# Patient Record
Sex: FEMALE | Race: WHITE | Employment: OTHER | URBAN - METROPOLITAN AREA
[De-identification: names, ages, dates, MRNs, and addresses within clinical notes are randomized per-mention and may not be internally consistent; named-entity substitution may affect disease eponyms.]

---

## 2017-10-10 ENCOUNTER — TELEPHONE (OUTPATIENT)
Dept: FAMILY MEDICINE CLINIC | Facility: CLINIC | Age: 82
End: 2017-10-10

## 2017-10-11 ENCOUNTER — OFFICE VISIT (OUTPATIENT)
Dept: FAMILY MEDICINE CLINIC | Facility: CLINIC | Age: 82
End: 2017-10-11

## 2017-10-11 VITALS
DIASTOLIC BLOOD PRESSURE: 64 MMHG | RESPIRATION RATE: 12 BRPM | BODY MASS INDEX: 26.06 KG/M2 | TEMPERATURE: 100 F | SYSTOLIC BLOOD PRESSURE: 110 MMHG | WEIGHT: 138 LBS | HEIGHT: 61 IN | HEART RATE: 60 BPM

## 2017-10-11 DIAGNOSIS — Z79.01 LONG TERM CURRENT USE OF ANTICOAGULANT: ICD-10-CM

## 2017-10-11 DIAGNOSIS — F32.5 MAJOR DEPRESSION IN REMISSION (HCC): ICD-10-CM

## 2017-10-11 DIAGNOSIS — E78.00 PURE HYPERCHOLESTEROLEMIA: ICD-10-CM

## 2017-10-11 DIAGNOSIS — Z85.828 HISTORY OF SKIN CANCER: ICD-10-CM

## 2017-10-11 DIAGNOSIS — I63.9 CEREBROVASCULAR ACCIDENT (CVA), UNSPECIFIED MECHANISM (HCC): Primary | ICD-10-CM

## 2017-10-11 DIAGNOSIS — I10 ESSENTIAL HYPERTENSION: ICD-10-CM

## 2017-10-11 DIAGNOSIS — Z87.11 HISTORY OF GASTRIC ULCER: ICD-10-CM

## 2017-10-11 DIAGNOSIS — G45.9 TRANSIENT CEREBRAL ISCHEMIA, UNSPECIFIED TYPE: ICD-10-CM

## 2017-10-11 DIAGNOSIS — F41.9 ANXIETY: ICD-10-CM

## 2017-10-11 PROBLEM — M17.11 ARTHRITIS OF KNEE, RIGHT: Status: ACTIVE | Noted: 2017-10-11

## 2017-10-11 PROCEDURE — 99204 OFFICE O/P NEW MOD 45 MIN: CPT | Performed by: FAMILY MEDICINE

## 2017-10-11 PROCEDURE — 99358 PROLONG SERVICE W/O CONTACT: CPT | Performed by: FAMILY MEDICINE

## 2017-10-11 NOTE — PROGRESS NOTES
Patient presents with:  Establish Care: New Patient     HPI:   Navya Kumar is a 80year old female who presents to the office to establish care. Feels like she is in good health currently.   Used to see Dr. Radha Paredes in [facility].     Knee - act including hip     Actinic keratosis     Encounter for therapeutic drug monitoring     Long term (current) use of anticoagulants     Seborrheic keratoses        Current Outpatient Prescriptions on File Prior to Visit:  lisinopril 20 MG Oral Tab Take 1 table Packs/day     Quit date: 10/11/1977    Smokeless tobacco: Never Used    Comment: smoked 1ppd for 3-4 yrs.      Alcohol use Yes    Comment: 1 drink daily    Drug use: No    Sexual activity: Not on file     Other Topics Concern   None on file     Social Histo Cerebrovascular accident (CVA), unspecified mechanism ([facility] [address]  X 3 total.  Some TIAs. Dates as above  On AC since then, and has not had recurrence. No residual deficits. Continue AC. Managed by cardio currently.      2. Transient cerebral ischemia, uns possible.           Tim Pfeiffer M.D.   EMG 3  10/13/17

## 2017-12-27 ENCOUNTER — TELEPHONE (OUTPATIENT)
Dept: FAMILY MEDICINE CLINIC | Facility: CLINIC | Age: 82
End: 2017-12-27

## 2017-12-27 NOTE — TELEPHONE ENCOUNTER
Pt will be here 15 min prior to answer Annual Assessment Form for her Medicare Annual w/Dr Eldon Evans on 1/2/18 (form by daily checklist)

## 2018-01-02 ENCOUNTER — OFFICE VISIT (OUTPATIENT)
Dept: FAMILY MEDICINE CLINIC | Facility: CLINIC | Age: 83
End: 2018-01-02

## 2018-01-02 ENCOUNTER — LAB ENCOUNTER (OUTPATIENT)
Dept: LAB | Age: 83
End: 2018-01-02
Attending: FAMILY MEDICINE
Payer: MEDICARE

## 2018-01-02 VITALS
HEIGHT: 61.25 IN | DIASTOLIC BLOOD PRESSURE: 60 MMHG | BODY MASS INDEX: 26.09 KG/M2 | SYSTOLIC BLOOD PRESSURE: 136 MMHG | TEMPERATURE: 98 F | WEIGHT: 140 LBS | HEART RATE: 72 BPM

## 2018-01-02 DIAGNOSIS — I10 ESSENTIAL HYPERTENSION: ICD-10-CM

## 2018-01-02 DIAGNOSIS — Z87.11 HISTORY OF GASTRIC ULCER: ICD-10-CM

## 2018-01-02 DIAGNOSIS — Z13.31 DEPRESSION SCREENING: ICD-10-CM

## 2018-01-02 DIAGNOSIS — I63.9 CEREBROVASCULAR ACCIDENT (CVA), UNSPECIFIED MECHANISM (HCC): ICD-10-CM

## 2018-01-02 DIAGNOSIS — F32.5 MAJOR DEPRESSION IN REMISSION (HCC): ICD-10-CM

## 2018-01-02 DIAGNOSIS — Z00.00 ENCOUNTER FOR ANNUAL HEALTH EXAMINATION: Primary | ICD-10-CM

## 2018-01-02 DIAGNOSIS — E78.00 PURE HYPERCHOLESTEROLEMIA: ICD-10-CM

## 2018-01-02 LAB
ALBUMIN SERPL-MCNC: 3.7 G/DL (ref 3.5–4.8)
ALP LIVER SERPL-CCNC: 81 U/L (ref 55–142)
ALT SERPL-CCNC: 29 U/L (ref 14–54)
AST SERPL-CCNC: 34 U/L (ref 15–41)
BILIRUB SERPL-MCNC: 0.6 MG/DL (ref 0.1–2)
BUN BLD-MCNC: 19 MG/DL (ref 8–20)
CALCIUM BLD-MCNC: 9.5 MG/DL (ref 8.3–10.3)
CHLORIDE: 106 MMOL/L (ref 101–111)
CHOLEST SMN-MCNC: 162 MG/DL (ref ?–200)
CO2: 26 MMOL/L (ref 22–32)
CREAT BLD-MCNC: 0.95 MG/DL (ref 0.55–1.02)
GLUCOSE BLD-MCNC: 98 MG/DL (ref 70–99)
HDLC SERPL-MCNC: 66 MG/DL (ref 45–?)
HDLC SERPL: 2.45 {RATIO} (ref ?–4.44)
LDLC SERPL CALC-MCNC: 76 MG/DL (ref ?–130)
M PROTEIN MFR SERPL ELPH: 7.2 G/DL (ref 6.1–8.3)
NONHDLC SERPL-MCNC: 96 MG/DL (ref ?–130)
POTASSIUM SERPL-SCNC: 4.1 MMOL/L (ref 3.6–5.1)
SODIUM SERPL-SCNC: 141 MMOL/L (ref 136–144)
TRIGL SERPL-MCNC: 101 MG/DL (ref ?–150)
VLDLC SERPL CALC-MCNC: 20 MG/DL (ref 5–40)

## 2018-01-02 PROCEDURE — 80053 COMPREHEN METABOLIC PANEL: CPT

## 2018-01-02 PROCEDURE — G0439 PPPS, SUBSEQ VISIT: HCPCS | Performed by: FAMILY MEDICINE

## 2018-01-02 PROCEDURE — 80061 LIPID PANEL: CPT

## 2018-01-02 PROCEDURE — G0444 DEPRESSION SCREEN ANNUAL: HCPCS | Performed by: FAMILY MEDICINE

## 2018-01-02 RX ORDER — OMEPRAZOLE 20 MG/1
CAPSULE, DELAYED RELEASE ORAL
COMMUNITY
Start: 2017-10-14 | End: 2018-01-02

## 2018-01-02 RX ORDER — ESCITALOPRAM OXALATE 10 MG/1
10 TABLET ORAL DAILY
Qty: 90 TABLET | Refills: 3 | Status: SHIPPED | OUTPATIENT
Start: 2018-01-02 | End: 2019-01-07

## 2018-01-02 RX ORDER — NICOTINE POLACRILEX 4 MG/1
1 GUM, CHEWING ORAL DAILY
Qty: 90 TABLET | Refills: 3 | Status: SHIPPED | OUTPATIENT
Start: 2018-01-02 | End: 2018-02-01

## 2018-01-02 NOTE — PATIENT INSTRUCTIONS
Ryan Callahan's SCREENING SCHEDULE   Tests on this list are recommended by your physician but may not be covered, or covered at this frequency, by your insurer. Please check with your insurance carrier before scheduling to verify coverage.    P have smoked more than 100 cigarettes in their lifetime   • Anyone with a family history    Colorectal Cancer Screening  Covered up to Age 76     Colonoscopy Screen   Covered every 10 years- more often if abnormal There are no preventive care reminders to d orders found for this or any previous visit. Please get every year    Pneumococcal 13 (Prevnar)  Covered Once after 65 No orders found for this or any previous visit.  Please get once after your 65th birthday    Pneumococcal 23 (Pneumovax)  Covered Once aft

## 2018-01-02 NOTE — PROGRESS NOTES
HPI:   Tameka Nina is a 80year old female who presents for a Medicare Subsequent Annual Wellness visit (Pt already had Initial Annual Wellness). Preventative  Breast: last year. Done at [facility].   Reports as normal.   Colon: last about 5 yrs a standard forms performed Face to Face with patient and Family/surrogate (if present), and forms available to patient in AVS           She smoked tobacco in the past but quit greater than 12 months ago.   Smoking status: Former Smoker Date   WBC 10.2 03/16/2011   HGB 14.3 03/16/2011   .0 03/16/2011        ALLERGIES:   She has No Known Allergies.     CURRENT MEDICATIONS:     Outpatient Prescriptions Marked as Taking for the 1/2/18 encounter (Office Visit) with Yumiko Saab MD: negative  Respiratory: negative  Cardiovascular: negative  Gastrointestinal: negative  Genitourinary:negative  Neurological: negative      EXAM:   /60   Pulse 72   Temp 98.4 °F (36.9 °C) (Oral)   Ht 61.25\"   Wt 140 lb   BMI 26.24 kg/m²  Estimated papi Vaccination History     Immunization History   Administered Date(s) Administered   • Influenza 09/11/2014   • Influenza Vaccine, High Dose, Preserv Free 10/07/2015, 10/01/2016, 10/06/2017   • Pneumococcal (Prevnar 13) 02/26/2016   • Pneumovax 23 11 past six months, have you lost more than 10 pounds without trying?: 2 - No  Has your appetite been poor?: No  How does the patient maintain a good energy level?: Other  How would you describe your daily physical activity?: Moderate  How would you describe risk No results found for: CHLAMYDIA No flowsheet data found. Screening Mammogram      Mammogram Annually to 76, then as discussed There are no preventive care reminders to display for this patient.  Update Health Maintenance if applicable     Immunizati

## 2018-12-31 ENCOUNTER — TELEPHONE (OUTPATIENT)
Dept: FAMILY MEDICINE CLINIC | Facility: CLINIC | Age: 83
End: 2018-12-31

## 2019-01-03 RX ORDER — OMEPRAZOLE 20 MG/1
CAPSULE, DELAYED RELEASE ORAL
COMMUNITY
Start: 2018-10-14 | End: 2019-01-07

## 2019-01-07 ENCOUNTER — APPOINTMENT (OUTPATIENT)
Dept: LAB | Age: 84
End: 2019-01-07
Attending: FAMILY MEDICINE
Payer: MEDICARE

## 2019-01-07 ENCOUNTER — OFFICE VISIT (OUTPATIENT)
Dept: FAMILY MEDICINE CLINIC | Facility: CLINIC | Age: 84
End: 2019-01-07
Payer: MEDICARE

## 2019-01-07 VITALS
BODY MASS INDEX: 28.22 KG/M2 | TEMPERATURE: 99 F | HEART RATE: 67 BPM | HEIGHT: 59 IN | RESPIRATION RATE: 14 BRPM | WEIGHT: 140 LBS | OXYGEN SATURATION: 97 % | SYSTOLIC BLOOD PRESSURE: 124 MMHG | DIASTOLIC BLOOD PRESSURE: 68 MMHG

## 2019-01-07 DIAGNOSIS — I63.9 CEREBROVASCULAR ACCIDENT (CVA), UNSPECIFIED MECHANISM (HCC): ICD-10-CM

## 2019-01-07 DIAGNOSIS — F32.5 MAJOR DEPRESSION IN REMISSION (HCC): ICD-10-CM

## 2019-01-07 DIAGNOSIS — E78.00 PURE HYPERCHOLESTEROLEMIA: ICD-10-CM

## 2019-01-07 DIAGNOSIS — Z00.00 ENCOUNTER FOR ANNUAL HEALTH EXAMINATION: Primary | ICD-10-CM

## 2019-01-07 DIAGNOSIS — I10 ESSENTIAL HYPERTENSION: ICD-10-CM

## 2019-01-07 DIAGNOSIS — M48.061 SPINAL STENOSIS OF LUMBAR REGION WITHOUT NEUROGENIC CLAUDICATION: ICD-10-CM

## 2019-01-07 DIAGNOSIS — Z13.31 DEPRESSION SCREENING: ICD-10-CM

## 2019-01-07 LAB
ALBUMIN SERPL-MCNC: 3.8 G/DL (ref 3.1–4.5)
ALBUMIN/GLOB SERPL: 1.1 {RATIO} (ref 1–2)
ALP LIVER SERPL-CCNC: 94 U/L (ref 55–142)
ALT SERPL-CCNC: 25 U/L (ref 14–54)
ANION GAP SERPL CALC-SCNC: 6 MMOL/L (ref 0–18)
AST SERPL-CCNC: 24 U/L (ref 15–41)
BILIRUB SERPL-MCNC: 0.6 MG/DL (ref 0.1–2)
BUN BLD-MCNC: 15 MG/DL (ref 8–20)
BUN/CREAT SERPL: 15.6 (ref 10–20)
CALCIUM BLD-MCNC: 9.6 MG/DL (ref 8.3–10.3)
CHLORIDE SERPL-SCNC: 108 MMOL/L (ref 101–111)
CHOLEST SMN-MCNC: 178 MG/DL (ref ?–200)
CO2 SERPL-SCNC: 27 MMOL/L (ref 22–32)
CREAT BLD-MCNC: 0.96 MG/DL (ref 0.55–1.02)
GLOBULIN PLAS-MCNC: 3.6 G/DL (ref 2.8–4.4)
GLUCOSE BLD-MCNC: 100 MG/DL (ref 70–99)
HDLC SERPL-MCNC: 63 MG/DL (ref 40–59)
LDLC SERPL CALC-MCNC: 93 MG/DL (ref ?–100)
M PROTEIN MFR SERPL ELPH: 7.4 G/DL (ref 6.4–8.2)
NONHDLC SERPL-MCNC: 115 MG/DL (ref ?–130)
OSMOLALITY SERPL CALC.SUM OF ELEC: 293 MOSM/KG (ref 275–295)
POTASSIUM SERPL-SCNC: 4 MMOL/L (ref 3.6–5.1)
SODIUM SERPL-SCNC: 141 MMOL/L (ref 136–144)
TRIGL SERPL-MCNC: 108 MG/DL (ref 30–149)
VLDLC SERPL CALC-MCNC: 22 MG/DL (ref 0–30)

## 2019-01-07 PROCEDURE — G0439 PPPS, SUBSEQ VISIT: HCPCS | Performed by: FAMILY MEDICINE

## 2019-01-07 PROCEDURE — G0444 DEPRESSION SCREEN ANNUAL: HCPCS | Performed by: FAMILY MEDICINE

## 2019-01-07 PROCEDURE — 80053 COMPREHEN METABOLIC PANEL: CPT

## 2019-01-07 PROCEDURE — 36415 COLL VENOUS BLD VENIPUNCTURE: CPT

## 2019-01-07 PROCEDURE — 80061 LIPID PANEL: CPT

## 2019-01-07 RX ORDER — ESCITALOPRAM OXALATE 10 MG/1
10 TABLET ORAL DAILY
Qty: 90 TABLET | Refills: 3 | Status: SHIPPED | OUTPATIENT
Start: 2019-01-07 | End: 2019-09-26

## 2019-01-07 RX ORDER — OMEPRAZOLE 20 MG/1
20 CAPSULE, DELAYED RELEASE ORAL EVERY MORNING
Qty: 90 CAPSULE | Refills: 3 | Status: SHIPPED | OUTPATIENT
Start: 2019-01-07 | End: 2020-01-15

## 2019-01-07 NOTE — PATIENT INSTRUCTIONS
Ryan Callahan's SCREENING SCHEDULE   Tests on this list are recommended by your physician but may not be covered, or covered at this frequency, by your insurer. Please check with your insurance carrier before scheduling to verify coverage.    P patients who meet one of the following criteria:   • Men who are 73-68 years old and have smoked more than 100 cigarettes in their lifetime   • Anyone with a family history    Colorectal Cancer Screening  Covered up to Age 76     Colonoscopy Screen   Cover Immunizations      Influenza  Covered Annually No orders found for this or any previous visit. Please get every year    Pneumococcal 13 (Prevnar)  Covered Once after 65 No orders found for this or any previous visit.  Please get once after your 65th birth

## 2019-01-07 NOTE — PROGRESS NOTES
HPI:   Gail Mosley is a 80year old female who presents for a Medicare Subsequent Annual Wellness visit (Pt already had Initial Annual Wellness). Preventative  Breast: last mammogram 2 yrs ago. Normal at that time.   Does report a remote ab at all  Feeling down, depressed, or hopeless (over the last two weeks)?: Not at all  PHQ-2 SCORE: 0     Advanced Directive:   She does have a Living Will but we do NOT have it on file in [employer].    The patient has this document but we do not have it in Epic, lb  01/02/18 : 140 lb     Last Cholesterol Labs:   Lab Results   Component Value Date    CHOLEST 162 01/02/2018    HDL 66 01/02/2018    LDL 76 01/02/2018    TRIG 101 01/02/2018          Last Chemistry Labs:   Lab Results   Component Value Date    AST 34 01 years ago. She smoked 1.00 pack per day. she has never used smokeless tobacco. She reports that she drinks alcohol. She reports that she does not use drugs.      REVIEW OF SYSTEMS:   Constitutional: negative  Eyes: negative  Ears, nose, mouth, throat, and f Skin: Skin color, texture, turgor normal, no rashes or lesions   Lymph nodes: Cervical, supraclavicular, and axillary nodes normal   Neurologic: Normal.  Normal straight leg raise, normal bilateral patellar reflexes and sensation in legs.          Vaccina METABOLIC PANEL (14); Future  - LIPID PANEL; Future    7. Depression screening  Routine.  - DEPRESSION SCREEN ANNUAL            Diet assessment: good     PLAN:  The patient indicates understanding of these issues and agrees to the plan.   Reinforced healthy No flowsheet data found. Bone Density Screening      Dexascan Every two years No results found for this or any previous visit. No flowsheet data found.     Pap and Pelvic      Pap: Every 3 yrs age 21-65 or Pap+HPV every 5 yrs age 33-67, age 72 and older 4.0    No flowsheet data found. Creatinine  Annually CREATININE (mg/dL)   Date Value   12/16/2016 0.96   03/16/2011 0.9     Creatinine (mg/dL)   Date Value   01/02/2018 0.95    No flowsheet data found.     Drug Serum Conc  Annually No results found for:

## 2019-01-15 ENCOUNTER — HOSPITAL ENCOUNTER (OUTPATIENT)
Dept: PHYSICAL THERAPY | Facility: HOSPITAL | Age: 84
Setting detail: THERAPIES SERIES
Discharge: HOME | End: 2019-01-15
Attending: FAMILY MEDICINE
Payer: MEDICARE

## 2019-01-15 DIAGNOSIS — M48.061 SPINAL STENOSIS OF LUMBAR REGION WITHOUT NEUROGENIC CLAUDICATION: ICD-10-CM

## 2019-01-15 PROCEDURE — 97110 THERAPEUTIC EXERCISES: CPT | Performed by: PHYSICAL THERAPIST

## 2019-01-15 PROCEDURE — 97162 PT EVAL MOD COMPLEX 30 MIN: CPT | Performed by: PHYSICAL THERAPIST

## 2019-01-15 NOTE — PROGRESS NOTES
SPINE EVALUATION:   Referring Physician: Dr. Leonidas Field  Diagnosis: Spinal stenosis of lumbar region without neurogenic claudication (Z99.908)     Date of Service: 1/15/2019     PATIENT SUMMARY   Kristy Guzmán is a 80year old y/o female who pres history of Anxiety, Diverticulosis, Essential hypertension, Hyperlipidemia, Hypertension, Long term (current) use of anticoagulants, Muscle cramps, Patent foramen ovale, Spinal stenosis, and Transient cerebral ischemia.     ASSESSMENT  The patient's signs a 5/5  Hip abduction: R 5/5; L 5/5  Hip Extension: R 5/5; L 5/5   Hip ER: R 5/5; L 5/5  Hip IR: R 5/5; L 5/5  Knee Flexion: R 5/5; L 5/5   Knee extension: R 5/5; L 5/5   PF: R 5/5; L 5/5  DF: R 5/5; L 5/5     Flexibility:   UE/Scapular LE    Hip Flexor: R15, maintain progress achieved in PT (8 visits)      Frequency / Duration: Patient will be seen for 2 x/week or a total of 8 visits over a 90 day period. Treatment will include: Manual Therapy; Therapeutic Exercises; Neuromuscular Re-education;  Therapeutic Act

## 2019-01-18 ENCOUNTER — HOSPITAL ENCOUNTER (OUTPATIENT)
Dept: PHYSICAL THERAPY | Facility: HOSPITAL | Age: 84
Setting detail: THERAPIES SERIES
Discharge: HOME | End: 2019-01-18
Attending: FAMILY MEDICINE
Payer: MEDICARE

## 2019-01-18 PROCEDURE — 97110 THERAPEUTIC EXERCISES: CPT | Performed by: PHYSICAL THERAPIST

## 2019-01-18 PROCEDURE — 97140 MANUAL THERAPY 1/> REGIONS: CPT | Performed by: PHYSICAL THERAPIST

## 2019-01-18 NOTE — PROGRESS NOTES
Dx: Spinal stenosis of lumbar region without neurogenic claudication (M48.061)        Authorized # of Visits:  8         Next MD visit: none scheduled  Fall Risk: standard         Precautions: n/a             Subjective:  Walking  is still hard.       Brandon Stevens Skilled Services: TNE, posture, body mechanics [facility]     Charges: 2 te 1 manual        Total Timed Treatment: 40 min  Total Treatment Time: 45 min

## 2019-01-22 ENCOUNTER — APPOINTMENT (OUTPATIENT)
Dept: PHYSICAL THERAPY | Facility: HOSPITAL | Age: 84
End: 2019-01-22
Attending: FAMILY MEDICINE
Payer: MEDICARE

## 2019-01-24 ENCOUNTER — HOSPITAL ENCOUNTER (OUTPATIENT)
Dept: PHYSICAL THERAPY | Facility: HOSPITAL | Age: 84
Setting detail: THERAPIES SERIES
Discharge: HOME | End: 2019-01-24
Attending: FAMILY MEDICINE
Payer: MEDICARE

## 2019-01-24 PROCEDURE — 97110 THERAPEUTIC EXERCISES: CPT | Performed by: PHYSICAL THERAPIST

## 2019-01-24 PROCEDURE — 97140 MANUAL THERAPY 1/> REGIONS: CPT | Performed by: PHYSICAL THERAPIST

## 2019-01-24 NOTE — PROGRESS NOTES
Dx: Spinal stenosis of lumbar region without neurogenic claudication (M48.061)           Authorized # of Visits:  8         Next MD visit: none scheduled  Fall Risk: standard         Precautions: n/a             Subjective: Felt really good after the last

## 2019-01-29 ENCOUNTER — APPOINTMENT (OUTPATIENT)
Dept: PHYSICAL THERAPY | Facility: HOSPITAL | Age: 84
End: 2019-01-29
Attending: FAMILY MEDICINE
Payer: MEDICARE

## 2019-02-05 ENCOUNTER — HOSPITAL ENCOUNTER (OUTPATIENT)
Dept: PHYSICAL THERAPY | Facility: HOSPITAL | Age: 84
Setting detail: THERAPIES SERIES
Discharge: HOME | End: 2019-02-05
Attending: FAMILY MEDICINE
Payer: MEDICARE

## 2019-02-05 PROCEDURE — 97110 THERAPEUTIC EXERCISES: CPT | Performed by: PHYSICAL THERAPIST

## 2019-02-05 PROCEDURE — 97140 MANUAL THERAPY 1/> REGIONS: CPT | Performed by: PHYSICAL THERAPIST

## 2019-02-05 NOTE — PROGRESS NOTES
Dx: Spinal stenosis of lumbar region without neurogenic claudication (M48.061)           Authorized # of Visits:  8         Next MD visit: none scheduled  Fall Risk: standard         Precautions: n/a             Subjective: Have had some pain in my left bu

## 2019-02-07 ENCOUNTER — HOSPITAL ENCOUNTER (OUTPATIENT)
Dept: PHYSICAL THERAPY | Facility: HOSPITAL | Age: 84
Setting detail: THERAPIES SERIES
Discharge: HOME | End: 2019-02-07
Attending: FAMILY MEDICINE
Payer: MEDICARE

## 2019-02-07 PROCEDURE — 97110 THERAPEUTIC EXERCISES: CPT | Performed by: PHYSICAL THERAPIST

## 2019-02-12 ENCOUNTER — TELEPHONE (OUTPATIENT)
Dept: PHYSICAL THERAPY | Facility: HOSPITAL | Age: 84
End: 2019-02-12

## 2019-02-12 ENCOUNTER — APPOINTMENT (OUTPATIENT)
Dept: PHYSICAL THERAPY | Facility: HOSPITAL | Age: 84
End: 2019-02-12
Payer: MEDICARE

## 2019-02-14 ENCOUNTER — HOSPITAL ENCOUNTER (OUTPATIENT)
Dept: PHYSICAL THERAPY | Facility: HOSPITAL | Age: 84
Setting detail: THERAPIES SERIES
Discharge: HOME | End: 2019-02-14
Attending: FAMILY MEDICINE
Payer: MEDICARE

## 2019-02-14 PROCEDURE — 97110 THERAPEUTIC EXERCISES: CPT | Performed by: PHYSICAL THERAPIST

## 2019-02-14 NOTE — PROGRESS NOTES
Dx: Spinal stenosis of lumbar region without neurogenic claudication (M48.061)           Authorized # of Visits:  8         Next MD visit: none scheduled  Fall Risk: standard         Precautions: n/a             Subjective: Had ome relief after the last ses

## 2019-02-18 ENCOUNTER — OFFICE VISIT (OUTPATIENT)
Dept: FAMILY MEDICINE CLINIC | Facility: CLINIC | Age: 84
End: 2019-02-18
Payer: MEDICARE

## 2019-02-18 ENCOUNTER — TELEPHONE (OUTPATIENT)
Dept: FAMILY MEDICINE CLINIC | Facility: CLINIC | Age: 84
End: 2019-02-18

## 2019-02-18 VITALS
SYSTOLIC BLOOD PRESSURE: 130 MMHG | RESPIRATION RATE: 16 BRPM | BODY MASS INDEX: 28.43 KG/M2 | OXYGEN SATURATION: 97 % | HEIGHT: 59 IN | TEMPERATURE: 98 F | DIASTOLIC BLOOD PRESSURE: 80 MMHG | WEIGHT: 141 LBS | HEART RATE: 97 BPM

## 2019-02-18 DIAGNOSIS — M54.41 ACUTE RIGHT-SIDED LOW BACK PAIN WITH RIGHT-SIDED SCIATICA: Primary | ICD-10-CM

## 2019-02-18 PROCEDURE — 99213 OFFICE O/P EST LOW 20 MIN: CPT | Performed by: NURSE PRACTITIONER

## 2019-02-18 RX ORDER — METHYLPREDNISOLONE 4 MG/1
TABLET ORAL
Qty: 1 KIT | Refills: 0 | Status: SHIPPED | OUTPATIENT
Start: 2019-02-18 | End: 2019-03-01

## 2019-02-18 RX ORDER — CYCLOBENZAPRINE HCL 5 MG
2.5 TABLET ORAL NIGHTLY PRN
Qty: 5 TABLET | Refills: 0 | Status: SHIPPED | OUTPATIENT
Start: 2019-02-18 | End: 2019-03-01

## 2019-02-18 NOTE — TELEPHONE ENCOUNTER
Pt reports sharp shooting pains in her lower back for the last two nights. Pain results after some time. Patient is without pain right now, however she would like to have the area assessed. Appt given for this afternoon with CLIFOFRD Ross.  Patient verbal

## 2019-02-18 NOTE — PROGRESS NOTES
Patient presents with:  Back Pain: patient c/o severe back pain radiate down her legs since yesterday. HPI:  Presents with 5 day history of worsening pain to right lower back with occasional radiation of pain through right buttock and leg.  Stated has nightly as needed for Muscle spasms. Disp: 5 tablet Rfl: 0   escitalopram (LEXAPRO) 10 MG Oral Tab Take 1 tablet (10 mg total) by mouth daily.  Disp: 90 tablet Rfl: 3   omeprazole 20 MG Oral Capsule Delayed Release Take 1 capsule (20 mg total) by mouth ever low dose- Warned pt about sedation with this medication and advised pt about necessary precautions and activities to avoid. Continue with PT. If no improvement will consider xrays and referral to Dr. Elías Jeong for possible injection if appropriate.  Verbalized u

## 2019-02-18 NOTE — PATIENT INSTRUCTIONS
Take Medrol dose pack. Take all tabs for first day at the same time. Then follow directions on package for remaining days. It is ok to take acetaminophen (Tylenol) while taking this medication.  If you have regular strength tylenol you may

## 2019-02-19 ENCOUNTER — HOSPITAL ENCOUNTER (OUTPATIENT)
Dept: PHYSICAL THERAPY | Facility: HOSPITAL | Age: 84
Setting detail: THERAPIES SERIES
Discharge: HOME | End: 2019-02-19
Attending: FAMILY MEDICINE
Payer: MEDICARE

## 2019-02-19 PROCEDURE — 97110 THERAPEUTIC EXERCISES: CPT | Performed by: PHYSICAL THERAPIST

## 2019-02-19 PROCEDURE — 97140 MANUAL THERAPY 1/> REGIONS: CPT | Performed by: PHYSICAL THERAPIST

## 2019-02-19 NOTE — PROGRESS NOTES
Dx: Spinal stenosis of lumbar region without neurogenic claudication (M48.061)           Authorized # of Visits:  8         Next MD visit: none scheduled  Fall Risk: standard         Precautions: n/a             Subjective:Had a really bad episode of pain x20      Bent knee fall outs x20 red tb   Bent knee fall outs x20 grn tb  Bent knee fall outs x20 grn tb  Passive hamstring stretch bilaterally x3 ea 30 sec each        Lunges for knee flexion x20   Hs stretch on step 4\"  Clams x20 right le.

## 2019-02-25 ENCOUNTER — HOSPITAL ENCOUNTER (OUTPATIENT)
Dept: PHYSICAL THERAPY | Facility: HOSPITAL | Age: 84
Setting detail: THERAPIES SERIES
Discharge: HOME | End: 2019-02-25
Attending: FAMILY MEDICINE
Payer: MEDICARE

## 2019-02-25 PROCEDURE — 97110 THERAPEUTIC EXERCISES: CPT | Performed by: PHYSICAL THERAPIST

## 2019-02-25 PROCEDURE — 97140 MANUAL THERAPY 1/> REGIONS: CPT | Performed by: PHYSICAL THERAPIST

## 2019-02-25 NOTE — PROGRESS NOTES
Dx: Spinal stenosis of lumbar region without neurogenic claudication (M48.061)           Authorized # of Visits:  8         Next MD visit: none scheduled  Fall Risk: standard         Precautions: n/a             Subjective: Still get a sharp pain in my rig supine x20    Bent knee fall outs x20 red tb  TKE x20   Isometric quads x20  Hs stretch x3 @ 15 sec hold TKE in supine x20  TKE in supine x20  TKE in supine x20  Passive hamstring stretch bilaterally x3 ea 30 sec each     Bent knee fall outs x20 red tb   B

## 2019-03-01 ENCOUNTER — HOSPITAL ENCOUNTER (OUTPATIENT)
Dept: PHYSICAL THERAPY | Facility: HOSPITAL | Age: 84
Setting detail: THERAPIES SERIES
Discharge: HOME | End: 2019-03-01
Attending: FAMILY MEDICINE
Payer: MEDICARE

## 2019-03-01 PROCEDURE — 97140 MANUAL THERAPY 1/> REGIONS: CPT | Performed by: PHYSICAL THERAPIST

## 2019-03-01 PROCEDURE — 97110 THERAPEUTIC EXERCISES: CPT | Performed by: PHYSICAL THERAPIST

## 2019-03-01 NOTE — PROGRESS NOTES
Dx: Spinal stenosis of lumbar region without neurogenic claudication (M48.061)           Authorized # of Visits:  8         Next MD visit: none scheduled  Fall Risk: standard         Precautions: n/a             Subjective: Whatever you did the other day i Bridge x20  DKTC Sb   Bridge x10  Pelvic tilt x20  Pelvic tilt x20 Sb DKTC   LBR   Bridge in pain free ranges TKE in supine x20  Red TB bent knee fall outs   Bridge x20   Bent knee fall outs x20 red tb  TKE x20   Isometric quads x20  Hs stretch x3 @ 15 s

## 2019-03-04 ENCOUNTER — HOSPITAL ENCOUNTER (OUTPATIENT)
Dept: PHYSICAL THERAPY | Facility: HOSPITAL | Age: 84
Setting detail: THERAPIES SERIES
Discharge: HOME | End: 2019-03-04
Attending: FAMILY MEDICINE
Payer: MEDICARE

## 2019-03-04 PROCEDURE — 97110 THERAPEUTIC EXERCISES: CPT | Performed by: PHYSICAL THERAPIST

## 2019-03-04 PROCEDURE — 97140 MANUAL THERAPY 1/> REGIONS: CPT | Performed by: PHYSICAL THERAPIST

## 2019-03-04 NOTE — PROGRESS NOTES
Dx: Spinal stenosis of lumbar region without neurogenic claudication (M48.061)           Authorized # of Visits:  8         Next MD visit: none scheduled  Fall Risk: standard         Precautions: n/a             Subjective: Pain is so much better than it w Red TB bent knee fall outs   Bridge x20   Hs stretch x3 @ 15 sec hold  DKTC x20   Bridge x20  DKTC Sb   Bridge x10  Pelvic tilt x20  Pelvic tilt x20 Sb DKTC   LBR   Bridge in pain free ranges TKE in supine x20  Red TB bent knee fall outs   Bridge x20 TKE i

## 2019-03-07 ENCOUNTER — HOSPITAL ENCOUNTER (OUTPATIENT)
Dept: PHYSICAL THERAPY | Facility: HOSPITAL | Age: 84
Setting detail: THERAPIES SERIES
Discharge: HOME | End: 2019-03-07
Attending: FAMILY MEDICINE
Payer: MEDICARE

## 2019-03-07 PROCEDURE — 97110 THERAPEUTIC EXERCISES: CPT | Performed by: PHYSICAL THERAPIST

## 2019-03-07 PROCEDURE — 97140 MANUAL THERAPY 1/> REGIONS: CPT | Performed by: PHYSICAL THERAPIST

## 2019-03-08 NOTE — PROGRESS NOTES
Dx: Spinal stenosis of lumbar region without neurogenic claudication (M48.061)           Authorized # of Visits:  8         Next MD visit: none scheduled  Fall Risk: standard         Precautions: n/a             Subjective: Continues to note sig.  Discomfor sets  YTB side steps x10 x2 sets Red TB bent knee fall outs   Bridge x20  Sb DKTC   LBR   Bridge in pain free ranges Sb DKTC   LBR   Bridge in pain free ranges Red TB bent knee fall outs   Bridge x20 Red TB bent knee fall outs   Bridge x20   Hs stretch x3

## 2019-03-11 ENCOUNTER — HOSPITAL ENCOUNTER (OUTPATIENT)
Dept: PHYSICAL THERAPY | Facility: HOSPITAL | Age: 84
Setting detail: THERAPIES SERIES
Discharge: HOME | End: 2019-03-11
Attending: FAMILY MEDICINE
Payer: MEDICARE

## 2019-03-11 PROCEDURE — 97140 MANUAL THERAPY 1/> REGIONS: CPT | Performed by: PHYSICAL THERAPIST

## 2019-03-11 PROCEDURE — 97110 THERAPEUTIC EXERCISES: CPT | Performed by: PHYSICAL THERAPIST

## 2019-03-11 NOTE — PROGRESS NOTES
Dx: Spinal stenosis of lumbar region without neurogenic claudication (M48.061)           Authorized # of Visits:  8         Next MD visit: none scheduled  Fall Risk: standard         Precautions: n/a           Subjective: Almost  90% better.     Objective: DKTC x20   Bridge x20  Balance board x3' each  Bent knee fall outs   Red tb  YTB side steps x10 x2 sets  YTB side steps x10 x2 sets Red TB bent knee fall outs   Bridge x20  Sb DKTC   LBR   Bridge in pain free ranges Sb DKTC   LBR   Bridge in pain free ra

## 2019-03-14 ENCOUNTER — HOSPITAL ENCOUNTER (OUTPATIENT)
Dept: PHYSICAL THERAPY | Facility: HOSPITAL | Age: 84
Setting detail: THERAPIES SERIES
Discharge: HOME | End: 2019-03-14
Attending: FAMILY MEDICINE
Payer: MEDICARE

## 2019-03-14 PROCEDURE — 97140 MANUAL THERAPY 1/> REGIONS: CPT | Performed by: PHYSICAL THERAPIST

## 2019-03-14 PROCEDURE — 97110 THERAPEUTIC EXERCISES: CPT | Performed by: PHYSICAL THERAPIST

## 2019-03-14 NOTE — PROGRESS NOTES
Dx: Spinal stenosis of lumbar region without neurogenic claudication (M48.061)           Authorized # of Visits:  8         Next MD visit: none scheduled  Fall Risk: standard         Precautions: n/a           Subjective: Almost  90% better.   Can tell that Sb DKTC   LBR   Bridge in pain free ranges Sb DKTC   LBR   Bridge in pain free ranges Red TB bent knee fall outs   Bridge x20 Red TB bent knee fall outs   Bridge x20 Red TB bent knee fall outs   Bridge x20   Hs stretch x3 @ 15 sec hold  DKTC x20   Bridge x

## 2019-03-18 ENCOUNTER — HOSPITAL ENCOUNTER (OUTPATIENT)
Dept: PHYSICAL THERAPY | Facility: HOSPITAL | Age: 84
Setting detail: THERAPIES SERIES
Discharge: HOME | End: 2019-03-18
Attending: FAMILY MEDICINE
Payer: MEDICARE

## 2019-03-18 PROCEDURE — 97110 THERAPEUTIC EXERCISES: CPT | Performed by: PHYSICAL THERAPIST

## 2019-03-18 PROCEDURE — 97140 MANUAL THERAPY 1/> REGIONS: CPT | Performed by: PHYSICAL THERAPIST

## 2019-03-18 NOTE — PROGRESS NOTES
Dx: Spinal stenosis of lumbar region without neurogenic claudication (M48.061)           Authorized # of Visits:  8         Next MD visit: none scheduled  Fall Risk: standard         Precautions: n/a        *Discharge Summary    Pt has attended 14, cancell use shopping cart when shopping  (8 visits)  MET   · Pt will demonstrate improved core strength to be able to perform home, self care activities   with <2/10 pain (8 visits)  MET   · Pt will be independent and compliant with comprehensive HEP to maintain p paraspinal and quadratus mm.  X10 ' Sb DKTC   LBR   Bridge in pain free ranges Sb DKTC   LBR   Bridge in pain free ranges Sb DKTC   LBR   Bridge in pain free ranges Sb DKTC   LBR   Bridge in pain free ranges  (review as she has a ball at home to do this ) ana mm x10'                     Skilled Services: TNE, posture, body mechanics [facility]     Charges: 1te     2 manual     Total Timed Treatment: 40 min  Total Treatment Time: 45 min

## 2019-05-22 ENCOUNTER — OFFICE VISIT (OUTPATIENT)
Dept: FAMILY MEDICINE CLINIC | Facility: CLINIC | Age: 84
End: 2019-05-22
Payer: MEDICARE

## 2019-05-22 VITALS
RESPIRATION RATE: 16 BRPM | HEIGHT: 61 IN | WEIGHT: 136 LBS | BODY MASS INDEX: 25.68 KG/M2 | HEART RATE: 72 BPM | DIASTOLIC BLOOD PRESSURE: 66 MMHG | SYSTOLIC BLOOD PRESSURE: 130 MMHG

## 2019-05-22 DIAGNOSIS — M54.50 CHRONIC RIGHT-SIDED LOW BACK PAIN WITHOUT SCIATICA: ICD-10-CM

## 2019-05-22 DIAGNOSIS — M48.061 SPINAL STENOSIS OF LUMBAR REGION WITHOUT NEUROGENIC CLAUDICATION: Primary | ICD-10-CM

## 2019-05-22 DIAGNOSIS — M25.552 LEFT HIP PAIN: ICD-10-CM

## 2019-05-22 DIAGNOSIS — G89.29 CHRONIC RIGHT-SIDED LOW BACK PAIN WITHOUT SCIATICA: ICD-10-CM

## 2019-05-22 PROCEDURE — 99213 OFFICE O/P EST LOW 20 MIN: CPT | Performed by: FAMILY MEDICINE

## 2019-05-22 NOTE — PROGRESS NOTES
Patient presents with:  Back Pain: pt dx spinal stenosis, pt has completed, pt still experiencing a little pain and wants to discuss how to treat conditon going forward     HPI:   Navya Kumar is a 80year old female who presents to the office (CPT=72100); Future    3. Left hip pain  New issue, L groin  Check XR - concern for hip pathology. - XR HIP W OR WO PELVIS 3 OR 4 VIEWS, BILAT(CPT=73522); Future    Ok to continue tylenol PRN. Up to 6 a day.        Julius Harrison M.D.

## 2019-05-24 ENCOUNTER — HOSPITAL ENCOUNTER (OUTPATIENT)
Dept: GENERAL RADIOLOGY | Facility: HOSPITAL | Age: 84
Discharge: HOME | End: 2019-05-24
Attending: FAMILY MEDICINE
Payer: MEDICARE

## 2019-05-24 DIAGNOSIS — M25.552 LEFT HIP PAIN: ICD-10-CM

## 2019-05-24 DIAGNOSIS — M48.061 SPINAL STENOSIS OF LUMBAR REGION WITHOUT NEUROGENIC CLAUDICATION: ICD-10-CM

## 2019-05-24 DIAGNOSIS — M54.50 CHRONIC RIGHT-SIDED LOW BACK PAIN WITHOUT SCIATICA: ICD-10-CM

## 2019-05-24 DIAGNOSIS — G89.29 CHRONIC RIGHT-SIDED LOW BACK PAIN WITHOUT SCIATICA: ICD-10-CM

## 2019-05-24 PROCEDURE — 73522 X-RAY EXAM HIPS BI 3-4 VIEWS: CPT | Performed by: FAMILY MEDICINE

## 2019-05-24 PROCEDURE — 72110 X-RAY EXAM L-2 SPINE 4/>VWS: CPT | Performed by: FAMILY MEDICINE

## 2019-06-11 ENCOUNTER — OFFICE VISIT (OUTPATIENT)
Dept: FAMILY MEDICINE CLINIC | Facility: CLINIC | Age: 84
End: 2019-06-11
Payer: MEDICARE

## 2019-06-11 VITALS
WEIGHT: 134 LBS | HEART RATE: 84 BPM | HEIGHT: 61 IN | TEMPERATURE: 98 F | RESPIRATION RATE: 16 BRPM | SYSTOLIC BLOOD PRESSURE: 134 MMHG | BODY MASS INDEX: 25.3 KG/M2 | DIASTOLIC BLOOD PRESSURE: 64 MMHG

## 2019-06-11 DIAGNOSIS — M48.061 SPINAL STENOSIS OF LUMBAR REGION WITHOUT NEUROGENIC CLAUDICATION: Primary | ICD-10-CM

## 2019-06-11 DIAGNOSIS — M25.552 LEFT HIP PAIN: ICD-10-CM

## 2019-06-11 PROCEDURE — 99213 OFFICE O/P EST LOW 20 MIN: CPT | Performed by: FAMILY MEDICINE

## 2019-06-11 NOTE — PROGRESS NOTES
Patient presents with:  Test Results: review recent x rays for the back, back pain persists, getting so severethat it is causing difficulties to walke     HPI:   Owen Lukas is a 80year old female who presents to the office for f/u of her ted pains, L sided on and off sciatica. Will get MRI lumbar spine.   If disease, may need to see neurosurgeon or back specialist.   In mean time, continue tylenol and will restart PT (which helped in the past)  - [address]

## 2019-06-18 DIAGNOSIS — I10 HYPERTENSION: ICD-10-CM

## 2019-06-18 DIAGNOSIS — G45.9 TRANSIENT CEREBRAL ISCHEMIA: ICD-10-CM

## 2019-06-18 DIAGNOSIS — E78.5 HYPERLIPIDEMIA: ICD-10-CM

## 2019-06-18 NOTE — TELEPHONE ENCOUNTER
Patient has her MRI spine appointment scheduled for this Friday. Patient stated she is having a little bit of anxiety and would like to know if lorazepam medication can be prescribed.

## 2019-06-20 RX ORDER — LORAZEPAM 0.5 MG/1
0.5 TABLET ORAL EVERY 4 HOURS PRN
Qty: 2 TABLET | Refills: 0 | OUTPATIENT
Start: 2019-06-20 | End: 2019-11-08 | Stop reason: ALTCHOICE

## 2019-06-20 NOTE — TELEPHONE ENCOUNTER
Rx for Lorazepam called to [facility]. Pt notified and she reassured me that her  will be driving her to and from the procedure.

## 2019-06-20 NOTE — TELEPHONE ENCOUNTER
Patient is calling on status she has her MRI scheduled for tomorrow and is calling re: medication to keep her calm

## 2019-06-21 ENCOUNTER — HOSPITAL ENCOUNTER (OUTPATIENT)
Dept: MRI IMAGING | Facility: HOSPITAL | Age: 84
Discharge: HOME | End: 2019-06-21
Attending: FAMILY MEDICINE
Payer: MEDICARE

## 2019-06-21 DIAGNOSIS — M48.061 SPINAL STENOSIS OF LUMBAR REGION WITHOUT NEUROGENIC CLAUDICATION: ICD-10-CM

## 2019-06-21 DIAGNOSIS — M25.552 LEFT HIP PAIN: ICD-10-CM

## 2019-06-21 PROCEDURE — 72148 MRI LUMBAR SPINE W/O DYE: CPT | Performed by: FAMILY MEDICINE

## 2019-06-27 ENCOUNTER — HOSPITAL ENCOUNTER (OUTPATIENT)
Dept: PHYSICAL THERAPY | Facility: HOSPITAL | Age: 84
Setting detail: THERAPIES SERIES
Discharge: HOME | End: 2019-06-27
Attending: FAMILY MEDICINE
Payer: MEDICARE

## 2019-06-27 ENCOUNTER — TELEPHONE (OUTPATIENT)
Dept: FAMILY MEDICINE CLINIC | Facility: CLINIC | Age: 84
End: 2019-06-27

## 2019-06-27 DIAGNOSIS — M48.061 SPINAL STENOSIS OF LUMBAR REGION WITHOUT NEUROGENIC CLAUDICATION: ICD-10-CM

## 2019-06-27 DIAGNOSIS — M25.552 LEFT HIP PAIN: ICD-10-CM

## 2019-06-27 DIAGNOSIS — M48.061 SPINAL STENOSIS OF LUMBAR REGION AT MULTIPLE LEVELS: Primary | ICD-10-CM

## 2019-06-27 DIAGNOSIS — M51.36 DEGENERATIVE DISC DISEASE, LUMBAR: ICD-10-CM

## 2019-06-27 PROCEDURE — 97140 MANUAL THERAPY 1/> REGIONS: CPT | Performed by: PHYSICAL THERAPIST

## 2019-06-27 PROCEDURE — 97162 PT EVAL MOD COMPLEX 30 MIN: CPT | Performed by: PHYSICAL THERAPIST

## 2019-06-27 NOTE — PROGRESS NOTES
SPINE EVALUATION:   Referring Physician: Dr. Zion Liz  Diagnosis: Gait instability (R26.81)  Left groin pain (R10.32)  Joint pain of left hip on movement (M25.552)  Numbness and tingling in both hands (R20.0,R20.2)  Hand weakness (R29.898)  Muscle weakness dizziness, drop attacks, bowel/bladder changes, saddle anesthesia, and CARLA LE N/T.    Ruiz Shirley presents to physical therapy evaluation with primary c/o low back pain, left hip pain . She has a strong postural component to her pain and limited mobility.  The R 4/5; L 4/5  Hip Extension: R 4/5; L 4/5   Hip ER: R 4/5; L 4/5  Hip IR: R 4/5; L 4/5  Knee Flexion: R 4/5; L 4/5   Knee extension (L3): R 4/5; L 4/5   DF (L4): R 4/5; L 4/5  Great Toe Ext (L5): R 4/5, L 4/5  PF (S1): R 4/5; L 4/5     Flexibility:    LE and shoe wear  Patient was instructed in and issued a HEP for: review of low back exercise as given in prior treatment. They include Bridge. low back rogation ,     Charges: PT Eval Moderate Complexity, TE 1      Total Timed Treatment: 45 min     Total [facility] Dept: [phone]    Sincerely,  Electronically signed by therapist: Lidia Lange, PT    [de-identified] certification required: Yes  I certify the need for these services furnished under this plan of treatment and while under my care.     X___________________

## 2019-06-28 ENCOUNTER — HOSPITAL ENCOUNTER (OUTPATIENT)
Dept: CT IMAGING | Facility: HOSPITAL | Age: 84
Discharge: HOME | End: 2019-06-28
Attending: INTERNAL MEDICINE
Payer: MEDICARE

## 2019-06-28 DIAGNOSIS — R41.0 CONFUSION: ICD-10-CM

## 2019-06-28 DIAGNOSIS — R79.1 SUPRATHERAPEUTIC INR: ICD-10-CM

## 2019-06-28 PROCEDURE — 70450 CT HEAD/BRAIN W/O DYE: CPT | Performed by: INTERNAL MEDICINE

## 2019-07-01 ENCOUNTER — OFFICE VISIT (OUTPATIENT)
Dept: SURGERY | Facility: CLINIC | Age: 84
End: 2019-07-01
Payer: MEDICARE

## 2019-07-01 ENCOUNTER — HOSPITAL ENCOUNTER (OUTPATIENT)
Dept: PHYSICAL THERAPY | Facility: HOSPITAL | Age: 84
Setting detail: THERAPIES SERIES
Discharge: HOME | End: 2019-07-01
Attending: FAMILY MEDICINE
Payer: MEDICARE

## 2019-07-01 VITALS
WEIGHT: 130 LBS | DIASTOLIC BLOOD PRESSURE: 72 MMHG | HEIGHT: 62 IN | BODY MASS INDEX: 23.92 KG/M2 | HEART RATE: 76 BPM | SYSTOLIC BLOOD PRESSURE: 132 MMHG

## 2019-07-01 DIAGNOSIS — M48.061 FORAMINAL STENOSIS OF LUMBAR REGION: ICD-10-CM

## 2019-07-01 DIAGNOSIS — M62.81 MUSCLE WEAKNESS ON EXAMINATION: ICD-10-CM

## 2019-07-01 DIAGNOSIS — M43.16 SPONDYLOLISTHESIS, LUMBAR REGION: ICD-10-CM

## 2019-07-01 DIAGNOSIS — R20.2 NUMBNESS AND TINGLING IN BOTH HANDS: ICD-10-CM

## 2019-07-01 DIAGNOSIS — R10.32 LEFT GROIN PAIN: Primary | ICD-10-CM

## 2019-07-01 DIAGNOSIS — R29.898 HAND WEAKNESS: ICD-10-CM

## 2019-07-01 DIAGNOSIS — R20.0 NUMBNESS AND TINGLING IN BOTH HANDS: ICD-10-CM

## 2019-07-01 DIAGNOSIS — M48.062 LUMBAR STENOSIS WITH NEUROGENIC CLAUDICATION: ICD-10-CM

## 2019-07-01 DIAGNOSIS — M25.552 JOINT PAIN OF LEFT HIP ON MOVEMENT: ICD-10-CM

## 2019-07-01 DIAGNOSIS — M51.36 DDD (DEGENERATIVE DISC DISEASE), LUMBAR: ICD-10-CM

## 2019-07-01 DIAGNOSIS — R26.81 GAIT INSTABILITY: ICD-10-CM

## 2019-07-01 PROCEDURE — 99204 OFFICE O/P NEW MOD 45 MIN: CPT | Performed by: PHYSICIAN ASSISTANT

## 2019-07-01 NOTE — PROGRESS NOTES
Patient: Jannie Bailey  Medical Record Number: [id number]  YOB: 1935  PCP: Alexandre Oreilly MD    Referring Physician: Dr. Mendez Patient  Reason for visit: Back pain, lumbar stenosis    Dear Dr. Mendez Patient,  Thank you very much for requesting th to bladder/bowel function. No urinary incontinence. Urinary leakage with laughing, coughing and sneezing. States mild colon twisting, causing altered bowel habits. Has had this since hysterectomy. States takes Metamucil, this is under control.      Past Med 4 (four) hours as needed for Anxiety. Disp: 2 tablet Rfl: 0   Warfarin Sodium 7.5 MG Oral Tab TAKE 1/2 TO 1 TABLET DAILY AS DIRECTED BY COUMADIN CLINIC Disp: 70 tablet Rfl: 3   lisinopril 20 MG Oral Tab Take 1 tablet (20 mg total) by mouth daily.  Disp: 7 t NEG) Left   (POS or NEG)   Paraspinal Muscles, Cervical Neg Neg   Paraspinal Muscles, Lumbar Neg Neg   Sacroiliac Joint Region Neg Neg   Trochanteric Bursa Region Not assessed Neg     Upper Extremity Strength:    Deltoid  Biceps  Triceps  W.extension  F.ex broad-based disc bulge. AP diameter canal is narrowed severely to 4 mm. There is moderate subarticular zone and neural foraminal narrowing bilaterally. There is mild bilateral facet joint degenerative change.     L4-L5:  There is grade 1 anterolisthesis PROCEDURE:  XR LUMBAR SPINE (MIN 4 VIEWS) (CPT=72110)  TECHNIQUE:  AP, lateral, oblique, and coned down L5-S1 views were obtained. COMPARISON:  None.      INDICATIONS:  G89.29 Other chronic pain M54.5 Low back pain M48.061 Spinal stenosis, lumbar re weakness on examination, left groin/hip pain and gait instability. 4. Ortho:   - Referred to Dr. Delle Aschoff for left groin/hip pain with rotation.    5. Next office visit:   - Will consider MRI cervical spine to assess muscle weakness on examination, gait instab

## 2019-07-01 NOTE — PROGRESS NOTES
Dx: Spinal stenosis of lumbar region without neurogenic claudication (M48.061)  Left hip pain (M25.552)         Insurance (Authorized # of Visits):  [id number] [facility] Physician: Dr. Zion Liz  Next MD visit: none scheduled  Fall Risk: standard         P

## 2019-07-08 ENCOUNTER — HOSPITAL ENCOUNTER (OUTPATIENT)
Dept: PHYSICAL THERAPY | Facility: HOSPITAL | Age: 84
Setting detail: THERAPIES SERIES
Discharge: HOME | End: 2019-07-08
Attending: FAMILY MEDICINE
Payer: MEDICARE

## 2019-07-08 DIAGNOSIS — M25.552 LEFT HIP PAIN: ICD-10-CM

## 2019-07-08 DIAGNOSIS — M48.061 SPINAL STENOSIS OF LUMBAR REGION WITHOUT NEUROGENIC CLAUDICATION: ICD-10-CM

## 2019-07-08 PROCEDURE — 97110 THERAPEUTIC EXERCISES: CPT | Performed by: PHYSICAL THERAPIST

## 2019-07-08 PROCEDURE — 97140 MANUAL THERAPY 1/> REGIONS: CPT | Performed by: PHYSICAL THERAPIST

## 2019-07-08 NOTE — PROGRESS NOTES
Dx: Spinal stenosis of lumbar region without neurogenic claudication (M48.061)  Left hip pain (M25.552)         Insurance (Authorized # of Visits):  [address] [facility] Physician: Dr. Eder Rojas  Next MD visit: none scheduled  Fall Risk: standard         P min

## 2019-07-11 ENCOUNTER — APPOINTMENT (OUTPATIENT)
Dept: PHYSICAL THERAPY | Facility: HOSPITAL | Age: 84
End: 2019-07-11
Attending: FAMILY MEDICINE
Payer: MEDICARE

## 2019-07-12 ENCOUNTER — HOSPITAL ENCOUNTER (OUTPATIENT)
Dept: PHYSICAL THERAPY | Facility: HOSPITAL | Age: 84
Setting detail: THERAPIES SERIES
Discharge: HOME | End: 2019-07-12
Attending: FAMILY MEDICINE
Payer: MEDICARE

## 2019-07-12 PROCEDURE — 97140 MANUAL THERAPY 1/> REGIONS: CPT | Performed by: PHYSICAL THERAPIST

## 2019-07-12 PROCEDURE — 97110 THERAPEUTIC EXERCISES: CPT | Performed by: PHYSICAL THERAPIST

## 2019-07-12 NOTE — PROGRESS NOTES
Dx: Spinal stenosis of lumbar region without neurogenic claudication (M48.061)  Left hip pain (M25.552)         Insurance (Authorized # of Visits):  [address] [facility] Physician: Dr. Vandana Bergman  Next MD visit: none scheduled  Fall Risk: standard         P Guadlupe Cassette stretch x5' with green knobby roller ; contract relax to hip flexor to increase ROM left hip  Stretching:   Sidelying hip flexor   Bilaterally knee extension x5 reps @ 30 sec hold  Stretching:   Sidelying hip flexor   Bilaterally knee extension x5

## 2019-07-22 ENCOUNTER — HOSPITAL ENCOUNTER (OUTPATIENT)
Dept: PHYSICAL THERAPY | Facility: HOSPITAL | Age: 84
Setting detail: THERAPIES SERIES
Discharge: HOME | End: 2019-07-22
Attending: FAMILY MEDICINE
Payer: MEDICARE

## 2019-07-22 DIAGNOSIS — M25.552 LEFT HIP PAIN: ICD-10-CM

## 2019-07-22 DIAGNOSIS — M48.061 SPINAL STENOSIS OF LUMBAR REGION WITHOUT NEUROGENIC CLAUDICATION: ICD-10-CM

## 2019-07-22 PROCEDURE — 97140 MANUAL THERAPY 1/> REGIONS: CPT | Performed by: PHYSICAL THERAPIST

## 2019-07-22 PROCEDURE — 97110 THERAPEUTIC EXERCISES: CPT | Performed by: PHYSICAL THERAPIST

## 2019-07-22 NOTE — PROGRESS NOTES
Dx: Spinal stenosis of lumbar region without neurogenic claudication (M48.061)  Left hip pain (M25.552)         Insurance (Authorized # of Visits):  [address] [employer] [facility] Physician: Dr. Mitchell Muse  Next MD visit: none scheduled  Fall Risk: standard         P Seat 5 level 3  NuStep x7'  Seat 5 level 3  NuStep x7'  Seat 5 level 3     Inferior glide x5 pulls x15 sec each  Left hip   HS, stretch bilaterally inferior glide x5 pulls x15 sec each  Left hip   HS, stretch bilaterally inferior glide x5 pulls x15 sec eac

## 2019-07-24 ENCOUNTER — TELEPHONE (OUTPATIENT)
Dept: PHYSICAL THERAPY | Facility: HOSPITAL | Age: 84
End: 2019-07-24

## 2019-07-25 ENCOUNTER — APPOINTMENT (OUTPATIENT)
Dept: PHYSICAL THERAPY | Facility: HOSPITAL | Age: 84
End: 2019-07-25
Attending: FAMILY MEDICINE
Payer: MEDICARE

## 2019-07-29 ENCOUNTER — HOSPITAL ENCOUNTER (OUTPATIENT)
Dept: PHYSICAL THERAPY | Facility: HOSPITAL | Age: 84
Setting detail: THERAPIES SERIES
Discharge: HOME | End: 2019-07-29
Attending: FAMILY MEDICINE
Payer: MEDICARE

## 2019-07-29 DIAGNOSIS — M25.552 LEFT HIP PAIN: ICD-10-CM

## 2019-07-29 DIAGNOSIS — M48.061 SPINAL STENOSIS OF LUMBAR REGION WITHOUT NEUROGENIC CLAUDICATION: ICD-10-CM

## 2019-07-29 PROCEDURE — 97110 THERAPEUTIC EXERCISES: CPT | Performed by: PHYSICAL THERAPIST

## 2019-07-29 PROCEDURE — 97140 MANUAL THERAPY 1/> REGIONS: CPT | Performed by: PHYSICAL THERAPIST

## 2019-07-29 NOTE — PROGRESS NOTES
Dx: Spinal stenosis of lumbar region without neurogenic claudication (M48.061)  Left hip pain (M25.552)         Insurance (Authorized # of Visits):  [address] Physician: Dr. Efren Azul  Next MD visit: none scheduled  Fall Risk: standard         P hip   HS, stretch bilaterally inferior glide x5 pulls x15 sec each  Left hip   HS, stretch bilaterally inferior glide x5 pulls x15 sec each  Left hip   HS, stretch bilaterally inferior glide x5 pulls x15 sec each  Left hip   HS, stretch bilaterally   Red T

## 2019-08-01 ENCOUNTER — APPOINTMENT (OUTPATIENT)
Dept: PHYSICAL THERAPY | Facility: HOSPITAL | Age: 84
End: 2019-08-01
Attending: FAMILY MEDICINE
Payer: MEDICARE

## 2019-08-05 ENCOUNTER — HOSPITAL ENCOUNTER (OUTPATIENT)
Dept: PHYSICAL THERAPY | Facility: HOSPITAL | Age: 84
Setting detail: THERAPIES SERIES
Discharge: HOME | End: 2019-08-05
Attending: FAMILY MEDICINE
Payer: MEDICARE

## 2019-08-05 DIAGNOSIS — M48.061 SPINAL STENOSIS OF LUMBAR REGION WITHOUT NEUROGENIC CLAUDICATION: ICD-10-CM

## 2019-08-05 DIAGNOSIS — M25.552 LEFT HIP PAIN: ICD-10-CM

## 2019-08-05 PROCEDURE — 97140 MANUAL THERAPY 1/> REGIONS: CPT | Performed by: PHYSICAL THERAPIST

## 2019-08-05 PROCEDURE — 97110 THERAPEUTIC EXERCISES: CPT | Performed by: PHYSICAL THERAPIST

## 2019-08-05 NOTE — PROGRESS NOTES
Dx: Spinal stenosis of lumbar region without neurogenic claudication (M48.061)  Left hip pain (M25.552)         Insurance (Authorized # of Visits):  [address] [facility] Physician: Dr. Pan Davies MD visit: none scheduled  Fall Risk: standard         P sec each  Left hip   HS, stretch bilaterally inferior glide x5 pulls x15 sec each  Left hip   HS, stretch bilaterally inferior glide x5 pulls x15 sec each  Left hip   HS, stretch bilaterally inferior glide x5 pulls x15 sec each  Left hip   HS, stretch bila Timed Treatment: 40 min  Total Treatment Time: 45 min

## 2019-08-08 ENCOUNTER — HOSPITAL ENCOUNTER (OUTPATIENT)
Dept: PHYSICAL THERAPY | Facility: HOSPITAL | Age: 84
Setting detail: THERAPIES SERIES
Discharge: HOME | End: 2019-08-08
Attending: FAMILY MEDICINE
Payer: MEDICARE

## 2019-08-08 DIAGNOSIS — M48.061 SPINAL STENOSIS OF LUMBAR REGION WITHOUT NEUROGENIC CLAUDICATION: ICD-10-CM

## 2019-08-08 DIAGNOSIS — M25.552 LEFT HIP PAIN: ICD-10-CM

## 2019-08-08 PROCEDURE — 97110 THERAPEUTIC EXERCISES: CPT | Performed by: PHYSICAL THERAPIST

## 2019-08-08 NOTE — PROGRESS NOTES
Dx: Spinal stenosis of lumbar region without neurogenic claudication (M48.061)  Left hip pain (M25.552)         Insurance (Authorized # of Visits):  [address] Physician: Dr. Ofelia Leija  Next MD visit: none scheduled  Fall Risk: standard         P level 3  NuStep x7'  Seat 5 level 3 NuStep x7'  Seat 5 level 3   Inferior glide x5 pulls x15 sec each  Left hip   HS, stretch bilaterally inferior glide x5 pulls x15 sec each  Left hip   HS, stretch bilaterally inferior glide x5 pulls x15 sec each  Left hi on gray slant board x3 Calf stretch x30 sec on gray slant board x3   Supine LBR x20 reps   LBR , Bridge with sb x20 Hamstring and hip stretches in prone x3 each 30 sec hold each  Gait training for wide step x 3 laps in gym  inferiro glide left hip x3 @15 s

## 2019-08-19 ENCOUNTER — TELEPHONE (OUTPATIENT)
Dept: FAMILY MEDICINE CLINIC | Facility: CLINIC | Age: 84
End: 2019-08-19

## 2019-08-19 NOTE — TELEPHONE ENCOUNTER
Patient called to schedule and appt with Dr. Pan Pelletier for pain in groin and stomach on the left side. She thinks it may be diverticulitis.   She did not want to speak with a nurse only make an appt with Jerri Jesus tomorrow at 2:30 pm. She said she is lying low

## 2019-08-20 ENCOUNTER — OFFICE VISIT (OUTPATIENT)
Dept: FAMILY MEDICINE CLINIC | Facility: CLINIC | Age: 84
End: 2019-08-20
Payer: MEDICARE

## 2019-08-20 VITALS
WEIGHT: 127 LBS | HEART RATE: 63 BPM | RESPIRATION RATE: 16 BRPM | BODY MASS INDEX: 23 KG/M2 | TEMPERATURE: 99 F | OXYGEN SATURATION: 98 % | SYSTOLIC BLOOD PRESSURE: 112 MMHG | DIASTOLIC BLOOD PRESSURE: 78 MMHG

## 2019-08-20 DIAGNOSIS — K57.92 DIVERTICULITIS: ICD-10-CM

## 2019-08-20 DIAGNOSIS — M16.12 ARTHRITIS OF LEFT HIP: Primary | ICD-10-CM

## 2019-08-20 PROCEDURE — 99214 OFFICE O/P EST MOD 30 MIN: CPT | Performed by: FAMILY MEDICINE

## 2019-08-20 RX ORDER — METRONIDAZOLE 500 MG/1
500 TABLET ORAL 3 TIMES DAILY
Qty: 21 TABLET | Refills: 0 | Status: SHIPPED | OUTPATIENT
Start: 2019-08-20 | End: 2019-08-27

## 2019-08-20 RX ORDER — CIPROFLOXACIN 500 MG/1
500 TABLET, FILM COATED ORAL 2 TIMES DAILY
Qty: 14 TABLET | Refills: 0 | Status: SHIPPED | OUTPATIENT
Start: 2019-08-20 | End: 2019-08-27

## 2019-08-20 NOTE — PROGRESS NOTES
Patient presents with:  Pain: groin (left side). ..stomache pain, bloating, certain foods give stomache ache by left side, per pt. ... hystory of spinal stenosis     HPI:   Alo Garcia is a 80year old female with PMH CVA on AC, HLD, HTN who pres rubs, clicks or gallops  Abdomen - tender LLQ without guarding. Normal BS.     Neurological - alert, oriented, normal speech, no focal findings or movement disorder noted  Musculoskeletal - normal extension and flexion without pain, but any rotation or lat

## 2019-08-22 ENCOUNTER — HOSPITAL ENCOUNTER (OUTPATIENT)
Dept: PHYSICAL THERAPY | Facility: HOSPITAL | Age: 84
Setting detail: THERAPIES SERIES
Discharge: HOME | End: 2019-08-22
Attending: FAMILY MEDICINE
Payer: MEDICARE

## 2019-08-22 PROCEDURE — 97110 THERAPEUTIC EXERCISES: CPT | Performed by: PHYSICAL THERAPIST

## 2019-08-22 PROCEDURE — 97140 MANUAL THERAPY 1/> REGIONS: CPT | Performed by: PHYSICAL THERAPIST

## 2019-08-22 NOTE — PROGRESS NOTES
Dx: Spinal stenosis of lumbar region without neurogenic claudication (M48.061)  Left hip pain (M25.552)         Insurance (Authorized # of Visits):  6           Authorizing Physician: Dr. Denisse Mazariegos  Next MD visit: none scheduled  Fall Risk: standard         P level 3 NuStep x7'  Seat 5 level 3   Inferior glide x5 pulls x15 sec each  Left hip   HS, stretch bilaterally inferior glide x5 pulls x15 sec each  Left hip   HS, stretch bilaterally inferior glide x5 pulls x15 sec each  Left hip   HS, stretch bilaterally Bilaterally knee extension x5 reps @ 30 sec hold  Gait with emphasis on wide long stride/step length x100' Calf stretch x30 sec on gray slant board x3  Calf stretch x30 sec on gray slant board x3 Calf stretch x30 sec on gray slant board x3 Calf stretch x

## 2019-08-29 ENCOUNTER — HOSPITAL ENCOUNTER (OUTPATIENT)
Dept: PHYSICAL THERAPY | Facility: HOSPITAL | Age: 84
Setting detail: THERAPIES SERIES
Discharge: HOME | End: 2019-08-29
Attending: FAMILY MEDICINE
Payer: MEDICARE

## 2019-08-29 PROCEDURE — 97110 THERAPEUTIC EXERCISES: CPT | Performed by: PHYSICAL THERAPIST

## 2019-08-29 NOTE — PROGRESS NOTES
Dx: Spinal stenosis of lumbar region without neurogenic claudication (M48.061)  Left hip pain (M25.552)         Insurance (Authorized # of Visits):  6           Authorizing Physician: Dr. Carol Davies MD visit: none scheduled  Fall Risk: standard         P inferior glide x5 pulls x15 sec each  Left hip   HS, stretch bilaterally inferior glide x5 pulls x15 sec each  Left hip   HS, stretch bilaterally inferior glide x5 pulls x15 sec each  Left hip   HS, stretch bilaterally inferior glide x5 pulls x15 sec each Bilaterally knee extension x5 reps @ 30 sec hold  Gait with emphasis on wide long stride/step length x100' Calf stretch x30 sec on gray slant board x3  Calf stretch x30 sec on gray slant board x3 Calf stretch x30 sec on gray slant board x3 Calf stretch x

## 2019-08-30 ENCOUNTER — APPOINTMENT (OUTPATIENT)
Dept: PHYSICAL THERAPY | Facility: HOSPITAL | Age: 84
End: 2019-08-30
Payer: MEDICARE

## 2019-09-03 ENCOUNTER — APPOINTMENT (OUTPATIENT)
Dept: PHYSICAL THERAPY | Facility: HOSPITAL | Age: 84
End: 2019-09-03
Payer: MEDICARE

## 2019-09-06 ENCOUNTER — HOSPITAL ENCOUNTER (OUTPATIENT)
Dept: PHYSICAL THERAPY | Facility: HOSPITAL | Age: 84
Setting detail: THERAPIES SERIES
Discharge: HOME | End: 2019-09-06
Attending: PHYSICIAN ASSISTANT
Payer: MEDICARE

## 2019-09-06 PROCEDURE — 97110 THERAPEUTIC EXERCISES: CPT | Performed by: PHYSICAL THERAPIST

## 2019-09-06 NOTE — PROGRESS NOTES
Dx: Spinal stenosis of lumbar region without neurogenic claudication (M48.061)  Left hip pain (M25.552)         Insurance (Authorized # of Visits):  6           Authorizing Physician: Dr. Denisse Mazariegos  Next MD visit: none scheduled  Fall Risk: standard         P sec each  Left hip   HS, stretch bilaterally inferior glide x5 pulls x15 sec each  Left hip   HS, stretch bilaterally inferior glide x5 pulls x15 sec each  Left hip   HS, stretch bilaterally inferior glide x5 pulls x15 sec each  Left hip   HS, stretch bila blue 1 yellow  squats   Toe raise x20 on shuttle  Single leg squats x20   Knee hip flexor /mer stretch x5' with green knobby roller ; contract relax to hip flexor to increase ROM left hip  Stretching:   Sidelying hip flexor   Bilaterally knee extension

## 2019-09-10 ENCOUNTER — TELEPHONE (OUTPATIENT)
Dept: FAMILY MEDICINE CLINIC | Facility: CLINIC | Age: 84
End: 2019-09-10

## 2019-09-10 NOTE — TELEPHONE ENCOUNTER
Pt did see Dr Morales rodriguez to discuss possible surgery on the hip. She will follow up after the appt this week to see if she needs to schedule a pre op.

## 2019-09-23 ENCOUNTER — TELEPHONE (OUTPATIENT)
Dept: FAMILY MEDICINE CLINIC | Facility: CLINIC | Age: 84
End: 2019-09-23

## 2019-09-23 ENCOUNTER — HOSPITAL ENCOUNTER (OUTPATIENT)
Dept: PHYSICAL THERAPY | Facility: HOSPITAL | Age: 84
Setting detail: THERAPIES SERIES
Discharge: HOME | End: 2019-09-23
Attending: PHYSICIAN ASSISTANT
Payer: MEDICARE

## 2019-09-23 PROCEDURE — 97110 THERAPEUTIC EXERCISES: CPT | Performed by: PHYSICAL THERAPIST

## 2019-09-23 NOTE — TELEPHONE ENCOUNTER
Patient scheduled her pre op for 9/26/19 at 10:30 am with Dr. Genny Parada. She is having surgery on 10/23/19 with Dr. Tyrese Mejia for left hip replacement. H&P and labs are to be done and she has to see her cardiologist for clearance as well.

## 2019-09-23 NOTE — PROGRESS NOTES
Dx: Spinal stenosis of lumbar region without neurogenic claudication (M48.061)  Left hip pain (M25.552)         Insurance (Authorized # of Visits):  6           Authorizing Physician: Dr. Sabino High  Next MD visit: none scheduled  Fall Risk: standard         P pain.  Is scheduled for THR in upcoming few weeks. Will resume after surgery.   Date: 7/1/2019  TX#: 2/8 Date:          7/8/19       TX#: 3/8 Date:               7/12/19  TX#: 4/8 Date:            7/22/19     TX#: 5/ Date: 7/29/19  Tx#: 6/8 8/5/19 8/8/19 8 hip flexor to increase ROM left hip Prone passive knee flexion, extension with hips to neutral x5 each  Toe raises on airex  SLB on airex x10 seconds   MIP on airex x 20   Squats x20 on airex  SLB on airex x5 sec at 10 ea LE   Toe raises x20  Shuttle x30 1 each        Quad sets with towel roll (HEP ) x10 ea LE                                 HEP: Zeferino ruiz , bridge,   Charges: 3   TE 0manual        Total Timed Treatment: 40 min  Total Treatment Time: 45 min

## 2019-09-25 RX ORDER — ACETAMINOPHEN 500 MG
1000 TABLET ORAL ONCE
Status: CANCELLED | OUTPATIENT
Start: 2019-09-25 | End: 2019-09-25

## 2019-09-25 NOTE — TELEPHONE ENCOUNTER
Received fax for pre op stating needs labs, EKG .  Pt has appt tomorrow morning, will order labs at appt

## 2019-09-26 ENCOUNTER — OFFICE VISIT (OUTPATIENT)
Dept: FAMILY MEDICINE CLINIC | Facility: CLINIC | Age: 84
End: 2019-09-26
Payer: MEDICARE

## 2019-09-26 VITALS
HEART RATE: 66 BPM | SYSTOLIC BLOOD PRESSURE: 134 MMHG | BODY MASS INDEX: 23.19 KG/M2 | HEIGHT: 62 IN | TEMPERATURE: 99 F | DIASTOLIC BLOOD PRESSURE: 66 MMHG | RESPIRATION RATE: 18 BRPM | WEIGHT: 126 LBS

## 2019-09-26 DIAGNOSIS — I10 ESSENTIAL HYPERTENSION: ICD-10-CM

## 2019-09-26 DIAGNOSIS — I63.9 CEREBROVASCULAR ACCIDENT (CVA), UNSPECIFIED MECHANISM (HCC): ICD-10-CM

## 2019-09-26 DIAGNOSIS — Z79.01 LONG TERM CURRENT USE OF ANTICOAGULANT: ICD-10-CM

## 2019-09-26 DIAGNOSIS — M16.12 ARTHRITIS OF LEFT HIP: ICD-10-CM

## 2019-09-26 DIAGNOSIS — Z01.818 PREOP EXAMINATION: Primary | ICD-10-CM

## 2019-09-26 DIAGNOSIS — E78.00 PURE HYPERCHOLESTEROLEMIA: ICD-10-CM

## 2019-09-26 DIAGNOSIS — F32.5 MAJOR DEPRESSION IN REMISSION (HCC): ICD-10-CM

## 2019-09-26 PROCEDURE — 99214 OFFICE O/P EST MOD 30 MIN: CPT | Performed by: FAMILY MEDICINE

## 2019-09-26 NOTE — PROGRESS NOTES
Patient presents with:  Pre-Op Exam: LEFT HIP REPLACEMENT 10/23    HPI:   Andrea Duran is a 80year old female who is being evaluated for pre-surgical clearance at the request of Dr. Angelo Snyder. Surgery: L hip replacement  Surgeon:  Tae BECERRA file prior to visit.      Allergies:  No Known Allergies    Past Surgical History:   Procedure Laterality Date   • Cataract Bilateral 10/11/2012   • Other      Right knee arthroscopy   • Tonsillectomy  1953   • Total abdominal hysterectomy  1994    VINCENT/BSO and symmetric   Skin: Skin color, texture, turgor normal, no rashes or lesions   Neurologic: Normal       ASSESSMENT AND PLAN:     Tonodavidson SorensonKeya was seen in the office today:  had concerns including Pre-Op Exam (LEFT HIP REPLACEMENT 10/23).     1 Neutrophils Absolute      1.50 - 7.70 x10(3) uL 3.10   Lymphocytes Absolute      1.00 - 4.00 x10(3) uL 2.77   Monocytes Absolute      0.10 - 1.00 x10(3) uL 0.64   Eosinophils Absolute      0.00 - 0.70 x10(3) uL 0.21   Basophils Absolute      0.00 - 0.20

## 2019-09-27 ENCOUNTER — HOSPITAL ENCOUNTER (OUTPATIENT)
Dept: PHYSICAL THERAPY | Facility: HOSPITAL | Age: 84
Setting detail: THERAPIES SERIES
Discharge: HOME | End: 2019-09-27
Attending: FAMILY MEDICINE
Payer: MEDICARE

## 2019-10-01 ENCOUNTER — APPOINTMENT (OUTPATIENT)
Dept: PHYSICAL THERAPY | Facility: HOSPITAL | Age: 84
End: 2019-10-01
Payer: MEDICARE

## 2019-10-03 ENCOUNTER — TELEPHONE (OUTPATIENT)
Dept: FAMILY MEDICINE CLINIC | Facility: CLINIC | Age: 84
End: 2019-10-03

## 2019-10-03 ENCOUNTER — LAB ENCOUNTER (OUTPATIENT)
Dept: LAB | Age: 84
End: 2019-10-03
Attending: FAMILY MEDICINE
Payer: MEDICARE

## 2019-10-03 DIAGNOSIS — Z79.01 LONG TERM CURRENT USE OF ANTICOAGULANT: ICD-10-CM

## 2019-10-03 DIAGNOSIS — M16.12 PRIMARY OSTEOARTHRITIS OF LEFT HIP: ICD-10-CM

## 2019-10-03 DIAGNOSIS — Z01.818 PREOP EXAMINATION: ICD-10-CM

## 2019-10-03 PROCEDURE — 87081 CULTURE SCREEN ONLY: CPT

## 2019-10-03 PROCEDURE — 86901 BLOOD TYPING SEROLOGIC RH(D): CPT

## 2019-10-03 PROCEDURE — 85025 COMPLETE CBC W/AUTO DIFF WBC: CPT

## 2019-10-03 PROCEDURE — 36415 COLL VENOUS BLD VENIPUNCTURE: CPT

## 2019-10-03 PROCEDURE — 85730 THROMBOPLASTIN TIME PARTIAL: CPT

## 2019-10-03 PROCEDURE — 86900 BLOOD TYPING SEROLOGIC ABO: CPT

## 2019-10-03 PROCEDURE — 80053 COMPREHEN METABOLIC PANEL: CPT

## 2019-10-03 PROCEDURE — 86850 RBC ANTIBODY SCREEN: CPT

## 2019-10-04 ENCOUNTER — APPOINTMENT (OUTPATIENT)
Dept: PHYSICAL THERAPY | Facility: HOSPITAL | Age: 84
End: 2019-10-04
Payer: MEDICARE

## 2019-10-08 ENCOUNTER — APPOINTMENT (OUTPATIENT)
Dept: PHYSICAL THERAPY | Facility: HOSPITAL | Age: 84
End: 2019-10-08
Payer: MEDICARE

## 2019-10-14 ENCOUNTER — APPOINTMENT (OUTPATIENT)
Dept: LAB | Facility: HOSPITAL | Age: 84
End: 2019-10-14
Attending: ORTHOPAEDIC SURGERY
Payer: MEDICARE

## 2019-10-14 ENCOUNTER — HOSPITAL ENCOUNTER (OUTPATIENT)
Dept: PHYSICAL THERAPY | Facility: HOSPITAL | Age: 84
Discharge: HOME | End: 2019-10-14
Attending: ORTHOPAEDIC SURGERY
Payer: MEDICARE

## 2019-10-14 DIAGNOSIS — M16.12 PRIMARY OSTEOARTHRITIS OF LEFT HIP: ICD-10-CM

## 2019-10-23 ENCOUNTER — HOSPITAL ENCOUNTER (INPATIENT)
Facility: HOSPITAL | Age: 84
LOS: 2 days | Discharge: HOME HEALTH | DRG: 470 | End: 2019-10-25
Attending: ORTHOPAEDIC SURGERY | Admitting: ORTHOPAEDIC SURGERY
Payer: MEDICARE

## 2019-10-23 ENCOUNTER — ANESTHESIA (OUTPATIENT)
Dept: SURGERY | Facility: HOSPITAL | Age: 84
DRG: 470 | End: 2019-10-23
Payer: MEDICARE

## 2019-10-23 ENCOUNTER — APPOINTMENT (OUTPATIENT)
Dept: GENERAL RADIOLOGY | Facility: HOSPITAL | Age: 84
DRG: 470 | End: 2019-10-23
Attending: ORTHOPAEDIC SURGERY
Payer: MEDICARE

## 2019-10-23 ENCOUNTER — ANESTHESIA EVENT (OUTPATIENT)
Dept: SURGERY | Facility: HOSPITAL | Age: 84
DRG: 470 | End: 2019-10-23
Payer: MEDICARE

## 2019-10-23 DIAGNOSIS — M16.12 PRIMARY OSTEOARTHRITIS OF LEFT HIP: Primary | ICD-10-CM

## 2019-10-23 PROCEDURE — 0SRB02A REPLACEMENT OF LEFT HIP JOINT WITH METAL ON POLYETHYLENE SYNTHETIC SUBSTITUTE, UNCEMENTED, OPEN APPROACH: ICD-10-PCS | Performed by: ORTHOPAEDIC SURGERY

## 2019-10-23 PROCEDURE — 76000 FLUOROSCOPY <1 HR PHYS/QHP: CPT | Performed by: ORTHOPAEDIC SURGERY

## 2019-10-23 PROCEDURE — 3E0T3BZ INTRODUCTION OF ANESTHETIC AGENT INTO PERIPHERAL NERVES AND PLEXI, PERCUTANEOUS APPROACH: ICD-10-PCS | Performed by: ANESTHESIOLOGY

## 2019-10-23 PROCEDURE — 99223 1ST HOSP IP/OBS HIGH 75: CPT | Performed by: INTERNAL MEDICINE

## 2019-10-23 DEVICE — PINNACLE HIP SOLUTIONS ALTRX POLYETHYLENE ACETABULAR LINER NEUTRAL 32MM ID 48MM OD
Type: IMPLANTABLE DEVICE | Site: HIP | Status: FUNCTIONAL
Brand: PINNACLE ALTRX

## 2019-10-23 DEVICE — PINNACLE POROCOAT ACETABULAR SHELL SECTOR II 48MM OD
Type: IMPLANTABLE DEVICE | Site: HIP | Status: FUNCTIONAL
Brand: PINNACLE POROCOAT

## 2019-10-23 DEVICE — ARTICUL/EZE FEMORAL HEAD DIAMETER 32MM +1 12/14 TAPER
Type: IMPLANTABLE DEVICE | Site: HIP | Status: FUNCTIONAL
Brand: ARTICUL/EZE

## 2019-10-23 DEVICE — CORAIL HIP SYSTEM CEMENTLESS FEMORAL STEM 12/14 AMT 125 DEGREES KLA SIZE 10 HA COATED HIGH OFFSET COLLAR
Type: IMPLANTABLE DEVICE | Site: HIP | Status: FUNCTIONAL
Brand: CORAIL

## 2019-10-23 RX ORDER — PRAVASTATIN SODIUM 40 MG
40 TABLET ORAL NIGHTLY
COMMUNITY
End: 2020-03-17

## 2019-10-23 RX ORDER — TIZANIDINE 2 MG/1
2 TABLET ORAL 3 TIMES DAILY PRN
Status: DISCONTINUED | OUTPATIENT
Start: 2019-10-23 | End: 2019-10-25

## 2019-10-23 RX ORDER — LORAZEPAM 0.5 MG/1
0.5 TABLET ORAL EVERY 4 HOURS PRN
Status: DISCONTINUED | OUTPATIENT
Start: 2019-10-23 | End: 2019-10-25

## 2019-10-23 RX ORDER — SODIUM CHLORIDE, SODIUM LACTATE, POTASSIUM CHLORIDE, CALCIUM CHLORIDE 600; 310; 30; 20 MG/100ML; MG/100ML; MG/100ML; MG/100ML
INJECTION, SOLUTION INTRAVENOUS CONTINUOUS
Status: DISCONTINUED | OUTPATIENT
Start: 2019-10-23 | End: 2019-10-23 | Stop reason: HOSPADM

## 2019-10-23 RX ORDER — SODIUM CHLORIDE 9 MG/ML
INJECTION, SOLUTION INTRAVENOUS CONTINUOUS
Status: DISCONTINUED | OUTPATIENT
Start: 2019-10-23 | End: 2019-10-25

## 2019-10-23 RX ORDER — ESCITALOPRAM OXALATE 10 MG/1
10 TABLET ORAL DAILY
COMMUNITY
End: 2020-01-14

## 2019-10-23 RX ORDER — ONDANSETRON 2 MG/ML
4 INJECTION INTRAMUSCULAR; INTRAVENOUS AS NEEDED
Status: DISCONTINUED | OUTPATIENT
Start: 2019-10-23 | End: 2019-10-23 | Stop reason: HOSPADM

## 2019-10-23 RX ORDER — HYDROCODONE BITARTRATE AND ACETAMINOPHEN 10; 325 MG/1; MG/1
2 TABLET ORAL AS NEEDED
Status: DISCONTINUED | OUTPATIENT
Start: 2019-10-23 | End: 2019-10-23 | Stop reason: HOSPADM

## 2019-10-23 RX ORDER — OXYCODONE HYDROCHLORIDE 15 MG/1
15 TABLET ORAL EVERY 4 HOURS PRN
Status: ACTIVE | OUTPATIENT
Start: 2019-10-23 | End: 2019-10-25

## 2019-10-23 RX ORDER — DIPHENHYDRAMINE HCL 25 MG
25 CAPSULE ORAL EVERY 4 HOURS PRN
Status: DISCONTINUED | OUTPATIENT
Start: 2019-10-23 | End: 2019-10-25

## 2019-10-23 RX ORDER — METOCLOPRAMIDE HYDROCHLORIDE 5 MG/ML
10 INJECTION INTRAMUSCULAR; INTRAVENOUS AS NEEDED
Status: DISCONTINUED | OUTPATIENT
Start: 2019-10-23 | End: 2019-10-23 | Stop reason: HOSPADM

## 2019-10-23 RX ORDER — PROCHLORPERAZINE EDISYLATE 5 MG/ML
10 INJECTION INTRAMUSCULAR; INTRAVENOUS EVERY 6 HOURS PRN
Status: DISCONTINUED | OUTPATIENT
Start: 2019-10-23 | End: 2019-10-25

## 2019-10-23 RX ORDER — DIPHENHYDRAMINE HYDROCHLORIDE 50 MG/ML
12.5 INJECTION INTRAMUSCULAR; INTRAVENOUS EVERY 4 HOURS PRN
Status: DISCONTINUED | OUTPATIENT
Start: 2019-10-23 | End: 2019-10-25

## 2019-10-23 RX ORDER — OXYCODONE HYDROCHLORIDE 5 MG/1
5 TABLET ORAL EVERY 4 HOURS PRN
Status: DISPENSED | OUTPATIENT
Start: 2019-10-23 | End: 2019-10-25

## 2019-10-23 RX ORDER — CEFAZOLIN SODIUM/WATER 2 G/20 ML
2 SYRINGE (ML) INTRAVENOUS ONCE
Status: COMPLETED | OUTPATIENT
Start: 2019-10-23 | End: 2019-10-23

## 2019-10-23 RX ORDER — SODIUM PHOSPHATE, DIBASIC AND SODIUM PHOSPHATE, MONOBASIC 7; 19 G/133ML; G/133ML
1 ENEMA RECTAL ONCE AS NEEDED
Status: DISCONTINUED | OUTPATIENT
Start: 2019-10-23 | End: 2019-10-25

## 2019-10-23 RX ORDER — SODIUM CHLORIDE, SODIUM LACTATE, POTASSIUM CHLORIDE, CALCIUM CHLORIDE 600; 310; 30; 20 MG/100ML; MG/100ML; MG/100ML; MG/100ML
INJECTION, SOLUTION INTRAVENOUS CONTINUOUS
Status: DISCONTINUED | OUTPATIENT
Start: 2019-10-23 | End: 2019-10-25

## 2019-10-23 RX ORDER — TEMAZEPAM 15 MG/1
15 CAPSULE ORAL NIGHTLY PRN
Status: DISCONTINUED | OUTPATIENT
Start: 2019-10-23 | End: 2019-10-25

## 2019-10-23 RX ORDER — WARFARIN SODIUM 7.5 MG/1
7.5 TABLET ORAL ONCE
Status: COMPLETED | OUTPATIENT
Start: 2019-10-23 | End: 2019-10-23

## 2019-10-23 RX ORDER — HYDROMORPHONE HYDROCHLORIDE 1 MG/ML
0.4 INJECTION, SOLUTION INTRAMUSCULAR; INTRAVENOUS; SUBCUTANEOUS EVERY 2 HOUR PRN
Status: DISCONTINUED | OUTPATIENT
Start: 2019-10-23 | End: 2019-10-25

## 2019-10-23 RX ORDER — HYDROMORPHONE HYDROCHLORIDE 1 MG/ML
0.2 INJECTION, SOLUTION INTRAMUSCULAR; INTRAVENOUS; SUBCUTANEOUS EVERY 2 HOUR PRN
Status: DISCONTINUED | OUTPATIENT
Start: 2019-10-23 | End: 2019-10-25

## 2019-10-23 RX ORDER — ACETAMINOPHEN 325 MG/1
650 TABLET ORAL 4 TIMES DAILY
Status: DISPENSED | OUTPATIENT
Start: 2019-10-23 | End: 2019-10-25

## 2019-10-23 RX ORDER — CEFAZOLIN SODIUM/WATER 2 G/20 ML
2 SYRINGE (ML) INTRAVENOUS EVERY 8 HOURS
Status: COMPLETED | OUTPATIENT
Start: 2019-10-23 | End: 2019-10-24

## 2019-10-23 RX ORDER — HYDROMORPHONE HYDROCHLORIDE 1 MG/ML
0.4 INJECTION, SOLUTION INTRAMUSCULAR; INTRAVENOUS; SUBCUTANEOUS EVERY 5 MIN PRN
Status: DISCONTINUED | OUTPATIENT
Start: 2019-10-23 | End: 2019-10-23 | Stop reason: HOSPADM

## 2019-10-23 RX ORDER — OXYCODONE HYDROCHLORIDE 10 MG/1
10 TABLET ORAL EVERY 4 HOURS PRN
Status: DISPENSED | OUTPATIENT
Start: 2019-10-23 | End: 2019-10-25

## 2019-10-23 RX ORDER — NALOXONE HYDROCHLORIDE 0.4 MG/ML
80 INJECTION, SOLUTION INTRAMUSCULAR; INTRAVENOUS; SUBCUTANEOUS AS NEEDED
Status: DISCONTINUED | OUTPATIENT
Start: 2019-10-23 | End: 2019-10-23 | Stop reason: HOSPADM

## 2019-10-23 RX ORDER — ACETAMINOPHEN 325 MG/1
TABLET ORAL
Status: COMPLETED
Start: 2019-10-23 | End: 2019-10-23

## 2019-10-23 RX ORDER — HYDROCODONE BITARTRATE AND ACETAMINOPHEN 10; 325 MG/1; MG/1
1 TABLET ORAL AS NEEDED
Status: DISCONTINUED | OUTPATIENT
Start: 2019-10-23 | End: 2019-10-23 | Stop reason: HOSPADM

## 2019-10-23 RX ORDER — ACETAMINOPHEN 325 MG/1
650 TABLET ORAL EVERY 6 HOURS PRN
Status: DISCONTINUED | OUTPATIENT
Start: 2019-10-23 | End: 2019-10-23 | Stop reason: HOSPADM

## 2019-10-23 RX ORDER — ATORVASTATIN CALCIUM 10 MG/1
10 TABLET, FILM COATED ORAL NIGHTLY
Status: DISCONTINUED | OUTPATIENT
Start: 2019-10-23 | End: 2019-10-25

## 2019-10-23 RX ORDER — POLYETHYLENE GLYCOL 3350 17 G/17G
17 POWDER, FOR SOLUTION ORAL DAILY PRN
Status: DISCONTINUED | OUTPATIENT
Start: 2019-10-23 | End: 2019-10-25

## 2019-10-23 RX ORDER — METOCLOPRAMIDE HYDROCHLORIDE 5 MG/ML
5 INJECTION INTRAMUSCULAR; INTRAVENOUS EVERY 6 HOURS PRN
Status: DISCONTINUED | OUTPATIENT
Start: 2019-10-23 | End: 2019-10-25

## 2019-10-23 RX ORDER — HYDROMORPHONE HYDROCHLORIDE 1 MG/ML
0.8 INJECTION, SOLUTION INTRAMUSCULAR; INTRAVENOUS; SUBCUTANEOUS EVERY 2 HOUR PRN
Status: DISCONTINUED | OUTPATIENT
Start: 2019-10-23 | End: 2019-10-25

## 2019-10-23 RX ORDER — ONDANSETRON 2 MG/ML
4 INJECTION INTRAMUSCULAR; INTRAVENOUS EVERY 4 HOURS PRN
Status: DISCONTINUED | OUTPATIENT
Start: 2019-10-23 | End: 2019-10-25

## 2019-10-23 RX ORDER — DOCUSATE SODIUM 100 MG/1
100 CAPSULE, LIQUID FILLED ORAL 2 TIMES DAILY
Status: DISCONTINUED | OUTPATIENT
Start: 2019-10-23 | End: 2019-10-25

## 2019-10-23 RX ORDER — BISACODYL 10 MG
10 SUPPOSITORY, RECTAL RECTAL
Status: DISCONTINUED | OUTPATIENT
Start: 2019-10-23 | End: 2019-10-25

## 2019-10-23 RX ORDER — PANTOPRAZOLE SODIUM 20 MG/1
20 TABLET, DELAYED RELEASE ORAL
Status: DISCONTINUED | OUTPATIENT
Start: 2019-10-24 | End: 2019-10-25

## 2019-10-23 RX ORDER — DIPHENHYDRAMINE HYDROCHLORIDE 50 MG/ML
25 INJECTION INTRAMUSCULAR; INTRAVENOUS ONCE AS NEEDED
Status: ACTIVE | OUTPATIENT
Start: 2019-10-23 | End: 2019-10-23

## 2019-10-23 RX ORDER — SENNOSIDES 8.6 MG
17.2 TABLET ORAL NIGHTLY
Status: DISCONTINUED | OUTPATIENT
Start: 2019-10-23 | End: 2019-10-25

## 2019-10-23 NOTE — ANESTHESIA POSTPROCEDURE EVALUATION
[address] Patient Status:  Surgery Admit - Inpt   Age/Gender 80year old female MRN [id number]   [facility] SURGERY Attending Omid Mcleod MD   Hosp Day # 0 PCP Chasity Bender MD       Anesthesia Post-op Not

## 2019-10-23 NOTE — CONSULTS
[facility]  Report of Consultation    Mayra Davila Patient Status:  Inpatient    3/23/1935 MRN [id number]   [facility] 3SW-A Attending Ervin Macdonald MD   Hosp Day # 0 PCP Ronny Beatty MD     Reason for Consultation:  Med TOTAL ABDOMINAL HYSTERECTOMY  1994    VINCENT/BSO Fibroids     Family History   Problem Relation Age of Onset   • Diabetes Mother    • Neurological Disorder Mother         Parkinsons   • Other (alzheimers) Mother    • Other (TIA) Mother      Social History: (REGLAN) injection 5 mg, 5 mg, Intravenous, Q6H PRN  •  Prochlorperazine Edisylate (COMPAZINE) injection 10 mg, 10 mg, Intravenous, Q6H PRN  •  diphenhydrAMINE HCl (BENADRYL) injection 25 mg, 25 mg, Intravenous, Once PRN  •  diphenhydrAMINE (BENADRYL) cap/ then may need to restart home blood pressure medications one at the time, metoprolol first  2. Anxiety  1. Continue home medication Ativan as needed  2. Patient states she does not take escitalopram at home anymore  3. Hyperlipidemia  1.  Continue statin  4

## 2019-10-23 NOTE — PROGRESS NOTES
Marlin Sparks   9/12/2019 9:10 AM   Office Visit   MRN:  [id number]   Description: 80year old female Provider: Jose Alfredo Evans MD Department: [facility]   Scanning Cover Sheet     Click to print Ambrocio Circe 852 for scanning omeprazole 20 MG Oral Capsule Delayed Release Take 1 capsule (20 mg total) by mouth every morning. Disp: 90 capsule Rfl: 3   Acetaminophen (TYLENOL 8 HOUR OR) Take  by mouth. Disp:  Rfl:    Coenzyme Q10 (CO Q-10 OR) Take  by mouth.  Disp:  Rfl:    Psyllium ASSESSMENT AND PLAN:   Left hip primary osteoarthritis:  Her diagnosis and options are reviewed again. She has tried physical therapy. She has tried giving this time. She takes Tylenol as needed.   Pain just continues to be too much at this point in time Chief Complaint: Patient presents with: Follow - Up: F/U Left hip OA      HPI:         Homa Palma is a 80year old female.     Patient follows up regarding her left hip. Left groin pain is getting to be too much.   At this point, she feels t • Spinal stenosis     • Spinal stenosis 2019   • Transient cerebral ischemia 2006           Past Surgical History:   Procedure Laterality Date   • CATARACT   10/11/2012     Left catarct removal    • OTHER         Right knee arthroscopy   • TONSILLECTOMY   Her diagnosis and options are reviewed again. She has tried physical therapy. She has tried giving this time. She takes Tylenol as needed. Pain just continues to be too much at this point in time.   I think she is a reasonable candidate for hip replacem

## 2019-10-23 NOTE — H&P
Sobeida Charles   9/26/2019 10:30 AM   Office Visit   MRN:  [id number]   Description: 80year old female Provider: Nallely Garcia MD Department: [facility]   Scanning Cover Sheet     Click to print Ambrocio Duffy 859 for scanning Warfarin Sodium 7.5 MG Oral Tab TAKE 1/2 TO 1 TABLET DAILY AS DIRECTED BY COUMADIN CLINIC Disp: 70 tablet Rfl: 3   lisinopril 20 MG Oral Tab Take 1 tablet (20 mg total) by mouth daily.  Disp: 7 tablet Rfl: 0   Metoprolol Succinate ER 50 MG Oral Tablet 24 Hr Nose: Nares normal, septum midline, mucosa normal, no drainage or sinus tenderness   Throat: Lips, mucosa, and tongue normal; teeth and gums normal   Neck: Supple, symmetrical, trachea midline, no adenopathy, thyroid: not enlarged, symmetric, no tenderness ADDENDUM:    The above referenced H&P was reviewed by Sandy Cheney MD on 10/23/2019, the patient was examined and no significant changes have occurred in the patient's condition since the H&P was performed.  I discussed with the patient and/or legal r

## 2019-10-23 NOTE — ANESTHESIA PREPROCEDURE EVALUATION
PRE-OP EVALUATION    Patient Name: Fabricio Handley    Pre-op Diagnosis: Primary osteoarthritis of left hip [M16.12]    Procedure(s):  LEFT ANTERIOR HIP REPLACEMENT    Surgeon(s) and Role:     Nadeem Greer MD - Primary    Pre-op vitals reviewed. Q-10 OR), Take 1 capsule by mouth daily. , Disp: , Rfl:   Psyllium (METAMUCIL OR), Take by mouth. Two scoops 2 times daily , Disp: , Rfl:         Allergies: Patient has no allergy information on record.       Anesthesia Evaluation    Patient summary review Pulmonary    Pulmonary exam normal.  Breath sounds clear to auscultation bilaterally. Other findings            ASA: 2   Plan: regional, MAC and spinal  NPO status verified and patient meets guidelines.           Plan/risks discussed with: portia

## 2019-10-23 NOTE — PROGRESS NOTES
[facility] Note:  Renal Dose Adjustment for Metoclopramide (REGLAN)    Navya Kumar has been prescribed Metoclopramide (REGLAN) 10 mg every 6 hours as needed for nausea/vomiting.     CrCl cannot be calculated (Patient's most recent lab result

## 2019-10-23 NOTE — OPERATIVE REPORT
[address] REPLACEMENT OPERATIVE REPORT    DATE OF SURGERY 10/23/2019    Alistair Jamie       [id number]     3/23/1935    PRE-OP DX:  LEFT HIP PRIMARY OSTEOARTHRITIS  POST-OP DX:  LEFT HIP PRIMARY OSTEOARTHRITIS  PROCEDURE:  Elsa Real DEGENERATIVE CHANGES WERE NOTED. FEMORAL NECK OSTEOTOMY WAS MADE. FEMORAL HEAD WAS REMOVED WITH A CORK SCREW. POSTERIOR AND INFERIOR ACETABULAR RETRACTORS WERE PLACED CAREFULLY. GOOD ACETABULAR EXPOSURE WAS OBTAINED. LABRAL TISSUE WAS EXCISED.   MED REMOVED. WOUND WAS IRRIGATED COPIOUSLY. HEMOSTASIS WAS OBTAINED. ACETABULUM WAS REEXPOSED. REAL LINER WAS IMPACTED. ITS SEATING WAS VERIFIED. PROXIMAL FEMUR WAS EXPOSED. REAL FEMORAL STEM WAS INSERTED WITH GOOD STABILITY. FEMORAL HEAD WAS IMPACTED.

## 2019-10-24 PROBLEM — Q21.1 PATENT FORAMEN OVALE: Chronic | Status: ACTIVE | Noted: 2019-10-24

## 2019-10-24 PROBLEM — K21.9 ESOPHAGEAL REFLUX: Chronic | Status: ACTIVE | Noted: 2019-10-24

## 2019-10-24 PROBLEM — Q21.12 PATENT FORAMEN OVALE (HCC): Chronic | Status: ACTIVE | Noted: 2019-10-24

## 2019-10-24 PROBLEM — Q21.12 PATENT FORAMEN OVALE: Chronic | Status: ACTIVE | Noted: 2019-10-24

## 2019-10-24 PROCEDURE — 99232 SBSQ HOSP IP/OBS MODERATE 35: CPT | Performed by: INTERNAL MEDICINE

## 2019-10-24 RX ORDER — PSEUDOEPHEDRINE HCL 30 MG
100 TABLET ORAL 2 TIMES DAILY
Qty: 60 CAPSULE | Refills: 0 | Status: SHIPPED | OUTPATIENT
Start: 2019-10-24 | End: 2019-11-08 | Stop reason: ALTCHOICE

## 2019-10-24 RX ORDER — HYDROCODONE BITARTRATE AND ACETAMINOPHEN 5; 325 MG/1; MG/1
2 TABLET ORAL EVERY 4 HOURS PRN
Status: DISCONTINUED | OUTPATIENT
Start: 2019-10-25 | End: 2019-10-25

## 2019-10-24 RX ORDER — TRAMADOL HYDROCHLORIDE 50 MG/1
50 TABLET ORAL EVERY 6 HOURS PRN
Qty: 60 TABLET | Refills: 1 | Status: SHIPPED | OUTPATIENT
Start: 2019-10-24 | End: 2019-11-08 | Stop reason: ALTCHOICE

## 2019-10-24 RX ORDER — HYDROCODONE BITARTRATE AND ACETAMINOPHEN 5; 325 MG/1; MG/1
1-2 TABLET ORAL EVERY 6 HOURS PRN
Qty: 40 TABLET | Refills: 0 | Status: SHIPPED | OUTPATIENT
Start: 2019-10-24 | End: 2019-11-08 | Stop reason: ALTCHOICE

## 2019-10-24 RX ORDER — WARFARIN SODIUM 7.5 MG/1
7.5 TABLET ORAL NIGHTLY
Status: DISCONTINUED | OUTPATIENT
Start: 2019-10-24 | End: 2019-10-25

## 2019-10-24 RX ORDER — HYDROCODONE BITARTRATE AND ACETAMINOPHEN 5; 325 MG/1; MG/1
1 TABLET ORAL EVERY 4 HOURS PRN
Status: DISCONTINUED | OUTPATIENT
Start: 2019-10-25 | End: 2019-10-25

## 2019-10-24 RX ORDER — ENOXAPARIN SODIUM 100 MG/ML
1 INJECTION SUBCUTANEOUS EVERY 12 HOURS SCHEDULED
Status: DISCONTINUED | OUTPATIENT
Start: 2019-10-24 | End: 2019-10-25

## 2019-10-24 NOTE — PHYSICAL THERAPY NOTE
PHYSICAL THERAPY HIP EVALUATION - INPATIENT     Room Number: 373/373-A  Evaluation Date: 10/24/2019  Type of Evaluation: Initial  Physician Order: PT Eval and Treat    Presenting Problem: s/p L anterior ELGIN 10/23/19  Reason for Therapy: Mobility Dysfunctio High fall risk    WEIGHT BEARING RESTRICTION  Weight Bearing Restriction: L lower extremity           L Lower Extremity: Weight Bearing as Tolerated    PAIN ASSESSMENT  Ratin  Location: L hip  Management Techniques:  Activity promotion;Repositioning educated on anterior hip precautions. Pt educated on transfer set up with use of RW. Pt sit-stand with RW and CGA for safety. Pt ambulated into bathroom with OT. Pt ambulated 100ft with RW and CGA for balance/safety.  Pt ambulates with step to gait pattern, RECOMMENDATIONS  PT Discharge Recommendations: Home with home health PT    PLAN  PT Treatment Plan: Bed mobility; Endurance; Energy conservation;Patient education;Gait training;Neuromuscular re-educate;Range of motion;Strengthening;Stair training;Transfer tr

## 2019-10-24 NOTE — PROGRESS NOTES
Orthopedic surgery progress note    Patrickeneharesh Harrell Patient Status:  Inpatient    3/23/1935 MRN [id number]   [facility] 3SW-A Attending Mookie Marie MD   Hosp Day # 1 PCP Richard Sal MD       Subjective:  No major complaints.

## 2019-10-24 NOTE — PLAN OF CARE
Pt AOX4. Denies any numbness/tingling on BLE. Aquacel dressing is C/D/I. Ice gel maintained. Up with  min assist using the walker. Voided adequate amount. Pain management plan explained.  Coumadin started last night as managed by hospitalist. As per pt, she

## 2019-10-24 NOTE — PLAN OF CARE
RECD ALERT ,AWAKE, ORIENTED, NO RESP DISTRESS, SEE MAR. PT AWARE OF PL;AN OF CARE. INSTRUCTED IS USE AND WIGGLE TOES.  TO CALL FOR ALL NEEDS AND ASSISTANCE

## 2019-10-24 NOTE — OCCUPATIONAL THERAPY NOTE
OCCUPATIONAL THERAPY EVALUATION - INPATIENT     Room Number: 373/373-A  Evaluation Date: 10/24/2019  Type of Evaluation: Initial  Presenting Problem: s/p L ELGIN on 10/23     Physician Order: IP Consult to Occupational Therapy  Reason for Therapy: ADL/IADL D Right  Drives: No  Patient Regularly Uses: Glasses    Prior Level of Function: Pt is typically mod I for ADL and functional mobility. Pt uses a cane in the community, for stairs and when shopping.  Pt's daughter and  will be available to assist upon 44.27  CMS Modifier (G-Code): CJ    FUNCTIONAL TRANSFER ASSESSMENT  Supine to Sit : Not tested  Sit to Stand: Contact guard assist    Skilled Therapy Provided: Pt was found sitting up in a chair. Pt was educated on anterior hip precautions.  Pt performed lo function.     Patient Complexity  Occupational Profile/Medical History LOW - Brief history including review of medical or therapy records    Specific performance deficits impacting engagement in ADL/IADL LOW  1 - 3 performance deficits    Client Assessment/

## 2019-10-24 NOTE — CM/SW NOTE
80 y sp total hip replacement. Post op protocol orders. [facility] orth[facility] joint journey plan is for pt to [facility] with [facility].      HOME SITUATION  Type of Home: House   Home Layout: Multi-level  Stairs to Enter : 6  Railing: Yes  Stairs

## 2019-10-24 NOTE — PROGRESS NOTES
[facility]  Progress Note     Omid Legions Patient Status:  Inpatient    3/23/1935 MRN [id number]   [facility] 3SW-A Attending Leslie Mendez MD   Hosp Day # 1 PCP Michiel Ormond, MD     Chief Complaint: POD 1    S: Denisse Nightly   • docusate sodium  100 mg Oral BID   • Pantoprazole Sodium  20 mg Oral QAM AC   • atorvastatin  10 mg Oral Nightly   • psyllium  1 packet Oral Daily       ASSESSMENT / PLAN:     1. S/p left ELGIN POD 1  1. Pain control   2. Coumadin   3. [place] OT  4.

## 2019-10-24 NOTE — DIETARY NOTE
[facility]     Admitting diagnosis:  Primary osteoarthritis of left hip [M16.12]    Ht: 157.5 cm (5' 2\")  Wt: 57 kg (125 lb 10.6 oz). This is 114% of IBW  Body mass index is 22.98 kg/m².   IBW: 50kg

## 2019-10-24 NOTE — PHYSICAL THERAPY NOTE
PHYSICAL THERAPY HIP TREATMENT NOTE - INPATIENT      Room Number: 373/373-A     Session: 1&2  Number of Visits to Meet Established Goals: 4    Presenting Problem: s/p L anterior ELGIN 10/23/19    Problem List  Active Problems:    Hyperlipidemia    Hypertensi FORM - BASIC MOBILITY  How much difficulty does the patient currently have. ..  -   Turning over in bed (including adjusting bedclothes, sheets and blankets)?: None   -   Sitting down on and standing up from a chair with arms (e.g., wheelchair, bedside comm Knee Extension 10 reps 15 reps   Standing heel/toe raises 10 reps 15 reps   Hamstring Curls 10 reps 15 reps   Forward, back steps 10 reps 15 reps   Short Squats 10 reps 15 reps     Comments:  Pt participated in group session, tolerance was good.    was

## 2019-10-24 NOTE — PROGRESS NOTES
Clarification needed re anticoagulation. Pt is on coumadin preop but ortho sx ordered eliquis. Ortho on call was paged    [id number]: Spoke with Carina STANTON.  Eliquis dc'armin and coumadin management deferred to hospitalist. Dr. Theo Toro was paged    [id number]: Dr. Nataliia Christina

## 2019-10-24 NOTE — HOME CARE LIAISON
MET WITH PTNT AND OFFERED CHOICE  OF AGENCIES. PTNT AGREEABLE TO [facility]. MET WITH PTNT TO DISCUSS HOME HEALTH SERVICES AND COVERAGE CRITERIA. PTNT AGREEABLE TO [facility]. PTNT GIVEN RESIDENTIAL BROCHURE.  RESIDENTIAL WITH PROVIDE SN/PT ON DIS

## 2019-10-25 VITALS
DIASTOLIC BLOOD PRESSURE: 64 MMHG | WEIGHT: 125.69 LBS | HEART RATE: 77 BPM | RESPIRATION RATE: 18 BRPM | OXYGEN SATURATION: 97 % | HEIGHT: 62 IN | TEMPERATURE: 98 F | BODY MASS INDEX: 23.13 KG/M2 | SYSTOLIC BLOOD PRESSURE: 109 MMHG

## 2019-10-25 PROCEDURE — 99231 SBSQ HOSP IP/OBS SF/LOW 25: CPT | Performed by: INTERNAL MEDICINE

## 2019-10-25 RX ORDER — ONDANSETRON 4 MG/1
4 TABLET, FILM COATED ORAL EVERY 8 HOURS PRN
Qty: 20 TABLET | Refills: 2 | Status: SHIPPED | OUTPATIENT
Start: 2019-10-25 | End: 2019-11-08 | Stop reason: ALTCHOICE

## 2019-10-25 RX ORDER — ENOXAPARIN SODIUM 100 MG/ML
1 INJECTION SUBCUTANEOUS EVERY 12 HOURS SCHEDULED
Qty: 3.42 ML | Refills: 0 | Status: SHIPPED | OUTPATIENT
Start: 2019-10-25 | End: 2019-10-28

## 2019-10-25 NOTE — PHYSICAL THERAPY NOTE
PHYSICAL THERAPY HIP TREATMENT NOTE - INPATIENT      Room Number: 373/373-A     Session: 3  Number of Visits to Meet Established Goals: 4    Presenting Problem: s/p L anterior ELGIN 10/23/19    Problem List  Active Problems:    Hyperlipidemia    Hypertension MOBILITY  How much difficulty does the patient currently have. ..  -   Turning over in bed (including adjusting bedclothes, sheets and blankets)?: None   -   Sitting down on and standing up from a chair with arms (e.g., wheelchair, bedside commode, etc.): N Session: Up in chair;Needs met;Call light within reach;RN aware of session/findings; All patient questions and concerns addressed; Family present    ASSESSMENT   Pt continues to present with impaired strength , endurance and balance below PLOF and will tricia

## 2019-10-25 NOTE — PLAN OF CARE
GO OVER WITH SPOUSE AND DAUGHTER RE: LOVENOX INJ. HH AWARE OF IT. D/C INSTRUCTIONS GIVEN TO PT AND FAMILY.  MEDS REFILL HERE AT OP PHARMACY

## 2019-10-25 NOTE — PROGRESS NOTES
Post Op Day 2 Ortho Note: Left Anterior ELGIN    Assessed patient in chair. States pain is managed with medications; denies itching/nausea/dizziness. Able to bear weight on sx leg; equal sensation in BLE. No further recommendations. Will sign-off.

## 2019-10-25 NOTE — PLAN OF CARE
AOX4. Denies any numbness/tingling. Aquacel dressing C/D/I. Up with min using the walker. Gel ice maintained. Ankle pumps, IS encouraged. Pain management plan explained. Plan is home with [facility] today. Reminded of \"call don't fall\" protocol.  Call light withi

## 2019-10-25 NOTE — CM/SW NOTE
10/25/19 1500   Discharge disposition   Expected discharge disposition Home-Health   Name of Melvin Proc. Mena Gildardo 1 services after discharge Skilled home care   Discharge transportation Private car

## 2019-10-25 NOTE — PROGRESS NOTES
[facility]  Progress Note     Bill Mouse Patient Status:  Inpatient    3/23/1935 MRN [id number]   [facility] 3SW-A Attending No att. providers found   Hosp Day # 2 PCP Kesha Henderson MD     Chief Complaint: hip pain FULL  · Joe: none  · Central line: none        Plan of care discussed with patient, family, RN.   Austen Fournier MD

## 2019-10-25 NOTE — PLAN OF CARE
SPOKE TO [facility] RE: LOVENOX INJ NEED TO BE SEEN AARON . DAUGHTER AND SPOUSE KNOWS HOW TO GIVE INJ . WILL INSTRUCTED HOW TO GIVE.

## 2019-10-25 NOTE — PLAN OF CARE
RECD ALERT ,AWAKE, ORIENTED. FAMILY AT BS. PT AND FAMILY AWARE PLAN OF CARE. INR 1.6 TODAY. CALL LIGHT W/IN REACH. TO CALL FOR ALL NEEDS AND ASSISTANCE.

## 2019-10-25 NOTE — OCCUPATIONAL THERAPY NOTE
OCCUPATIONAL THERAPY TREATMENT NOTE - INPATIENT     Room Number: 373/373-A  Session: 1   Number of Visits to Meet Established Goals: 2    Presenting Problem: s/p L ELGIN on 10/23     History related to current admission: Pt is 80year old female admitted on Lower Extremity: Weight Bearing as Tolerated    PAIN ASSESSMENT  Ratin  Location: L LE   Management Techniques: Activity promotion;Repositioning; Other (Comment)(ice )     ACTIVITY TOLERANCE                         O2 SATURATIONS                ACTIVITI answered, needs met and call light within reach. Pt's RN/PCT was made aware of pt's present position and condition. Patient End of Session: Up in chair;Needs met;Call light within reach; All patient questions and concerns addressed; Ice applied;SCDs in darryl goal met 10/25/2019  Patient will transfer from supine to sit:  with supervision goal met 10/25/2019  Patient will transfer from sit to stand:  with supervision goal met 10/25/2019  Patient will transfer to toilet:  with supervision goal met 10/25/2019

## 2019-10-25 NOTE — PROGRESS NOTES
Orthopedic surgery progress note    Germán Six Patient Status:  Inpatient    3/23/1935 MRN [id number]   [facility] 3SW-A Attending Herlinda Garvey MD   [facility] Day # 2 PCP María Lucas MD       Subjective:  No major complaints.

## 2019-10-28 ENCOUNTER — TELEPHONE (OUTPATIENT)
Dept: FAMILY MEDICINE CLINIC | Facility: CLINIC | Age: 84
End: 2019-10-28

## 2019-10-28 ENCOUNTER — PATIENT OUTREACH (OUTPATIENT)
Dept: CASE MANAGEMENT | Age: 84
End: 2019-10-28

## 2019-10-28 DIAGNOSIS — Z02.9 ENCOUNTERS FOR UNSPECIFIED ADMINISTRATIVE PURPOSE: ICD-10-CM

## 2019-10-28 DIAGNOSIS — M16.12 PRIMARY OSTEOARTHRITIS OF LEFT HIP: ICD-10-CM

## 2019-10-28 PROCEDURE — 1111F DSCHRG MED/CURRENT MED MERGE: CPT

## 2019-10-28 NOTE — PROGRESS NOTES
Initial Post Discharge Follow Up   Discharge Date: 10/25/19  Contact Date: 10/28/2019    Consent Verification:  Assessment Completed With: Patient  HIPAA Verified?   Yes    Discharge Dx:  S/p left ELGIN    Was TCC ordered: no    General:   • How have you b hours for 3 days. , Disp: 3.42 mL, Rfl: 0  traMADol HCl 50 MG Oral Tab, Take 1 tablet (50 mg total) by mouth every 6 (six) hours as needed for Pain (prior to stretching and therapy).  For moderate pain., Disp: 60 tablet, Rfl: 1  HYDROcodone-acetaminophen (NO your medication as prescribed? No  Are you having any concerns with constipation? No    Referrals/orders at D/C:  Home Health/Services ordered at D/C?   Yes   What services:   Rehab, CHF, Stroke, PT, OT, Speech, Other:  HH RN/PT  (NCM) (If HH was ordered) H Your appointments     Date & Time Appointment Department [facility])    Nov 06, 2019 10:30 AM CST POSTOP with Marylene Burrow, MD [facility])        Mar 06, 2020 11:30 AM CST Follow Up with Viet

## 2019-10-28 NOTE — TELEPHONE ENCOUNTER
Spoke with pt and she states that she promises to call tomorrow and set up TCM/Hospital Follow up. She states that she is doing well. Please call pt tomorrow if she does not call to set up TCM/Hospital follow UP. Routed to front staff.

## 2019-10-28 NOTE — TELEPHONE ENCOUNTER
Spoke to pt for TCM today. Pt does not have HFU appt scheduled at this time. TCM/HFU appt recommended by 11/8/19 as pt is a moderate risk for readmission. Please advise.     TRIAGE:  Please f/u with pt and try to get her to schedule as she would greatly

## 2019-10-29 ENCOUNTER — LAB REQUISITION (OUTPATIENT)
Dept: LAB | Facility: HOSPITAL | Age: 84
End: 2019-10-29
Payer: MEDICARE

## 2019-10-29 DIAGNOSIS — Z47.1 AFTERCARE FOLLOWING JOINT REPLACEMENT SURGERY: ICD-10-CM

## 2019-10-29 PROCEDURE — 85610 PROTHROMBIN TIME: CPT | Performed by: INTERNAL MEDICINE

## 2019-11-03 NOTE — DISCHARGE SUMMARY
Discharge Summary  Patient ID:  Kristyn Qiu  [id number]  80year old  3/23/1935    Admit date: 10/23/2019    Discharge date and time: 10/25/19    Attending Physician: No att. providers found     Reason for admission: left hip primary OA    Disc Pain. For severe pain. , Print, Disp-40 tablet, R-0    docusate sodium 100 MG Oral Cap  Take 100 mg by mouth 2 (two) times daily. , Normal, Disp-60 capsule, R-0      CONTINUE these medications which have NOT CHANGED    escitalopram 10 MG Oral Tab  Take 10 mg

## 2019-11-04 PROBLEM — Z86.73 HISTORY OF CVA (CEREBROVASCULAR ACCIDENT): Status: ACTIVE | Noted: 2019-11-04

## 2019-11-05 NOTE — PROGRESS NOTES
Discharge Summary  Pt has attended 8 visits in Physical Therapy.  Dx: Spinal stenosis of lumbar region without neurogenic claudication (M48.061)  Left hip pain (M25.552)         Insurance (Authorized # of Visits):  8           Authorizing Physician: Dr. Hayley Luna 7/12/19  TX#: 4/8 Date:            7/22/19     TX#: 5/ Date: 7/29/19  Tx#: 6/8 8/5/19 8/8/19 8/22/19 8/29 9/6/19   NuStep x7'  Seat 5 level 3  NuStep x7'  Seat 5 level 3  NuStep x7'  Seat 5 level 3  NuStep x7'  Seat 5 level 3  NuStep x7'  Seat MIP on airex x 20   Squats x20 on airex  SLB on airex x5 sec at 10 ea LE   Toe raises x20  Shuttle x30 1 blue 1 yellow  squats   Toe raise x20 on shuttle Shuttle x30 1 blue 1 yellow  squats   Toe raise x20 on shuttle  Single leg squats x20  Shuttle x30 1 Total Timed Treatment: 40 min  Total Treatment Time: 45 min

## 2019-11-05 NOTE — ADDENDUM NOTE
Encounter addended by: Romario Huffman PT on: 11/4/2019 10:14 PM   Actions taken: Clinical Note Signed

## 2019-11-08 ENCOUNTER — OFFICE VISIT (OUTPATIENT)
Dept: FAMILY MEDICINE CLINIC | Facility: CLINIC | Age: 84
End: 2019-11-08
Payer: MEDICARE

## 2019-11-08 VITALS
TEMPERATURE: 98 F | DIASTOLIC BLOOD PRESSURE: 62 MMHG | WEIGHT: 118 LBS | SYSTOLIC BLOOD PRESSURE: 126 MMHG | BODY MASS INDEX: 22 KG/M2 | HEART RATE: 76 BPM | RESPIRATION RATE: 14 BRPM

## 2019-11-08 DIAGNOSIS — M16.12 ARTHRITIS OF LEFT HIP: ICD-10-CM

## 2019-11-08 DIAGNOSIS — Z96.642 HISTORY OF LEFT HIP REPLACEMENT: Primary | ICD-10-CM

## 2019-11-08 DIAGNOSIS — I10 ESSENTIAL HYPERTENSION: ICD-10-CM

## 2019-11-08 PROCEDURE — 99495 TRANSJ CARE MGMT MOD F2F 14D: CPT | Performed by: FAMILY MEDICINE

## 2019-11-08 NOTE — PROGRESS NOTES
Patient presents with:  Hospital F/U: Left hip surgery on 10/25/19    HPI:    Bautista Beard is a 80year old female here today for hospital follow up.    She was discharged from Inpatient hospital, [facility] to Home   Admission Date: 10/23/ TABLET  escitalopram 10 MG Oral Tab, Take 10 mg by mouth daily. Acetaminophen (ACETAMINOPHEN EXTRA STRENGTH) 500 MG Oral Cap, Take 1,000 mg by mouth one time. Pravastatin Sodium 40 MG Oral Tab, Take 40 mg by mouth nightly.   Warfarin Sodium 7.5 MG Oral Ta exertion or palpitations  GI: denies abdominal pain, denies heartburn, denies diarrhea. Regular bowels, on stool softeners.    MUSCULOSKELETAL: denies pain, normal range of motion of extremities  NEURO: h/o CVA/TIA  PSYCHE: denies depression or anxiety  HE Management Certification:  I certify that the following are true:  Communication with the patient was made within 2 business days of discharge on date above   Medical Decision Making- Based on service period of discharge to 30 days:   · Number of Possible

## 2019-11-11 ENCOUNTER — OFFICE VISIT (OUTPATIENT)
Dept: PHYSICAL THERAPY | Age: 84
End: 2019-11-11
Attending: FAMILY MEDICINE
Payer: MEDICARE

## 2019-11-11 PROCEDURE — 97110 THERAPEUTIC EXERCISES: CPT

## 2019-11-11 PROCEDURE — 97161 PT EVAL LOW COMPLEX 20 MIN: CPT

## 2019-11-11 NOTE — PROGRESS NOTES
POST-OP HIP EVALUATION:   Referring Physician: Dr. Katie Lowry  Diagnosis:    Status post left anterior total hip replacement 10/23/2019 Date of Service: 11/11/2019     PATIENT SUMMARY   Ish Mir is a 80year old female who presents to ther diagnosis of gait dysfunction. Pt and PT discussed evaluation findings, pathology, POC and HEP. Pt voiced understanding and performs HEP correctly without reported pain.  Skilled Physical Therapy is medically necessary to address the above impairments and ADL such as community ambulation  · Pt will be able to squat to  light objects around the house with little to no difficulty or pain.    · Pt will improve functional hip strength to report ability to ascend/descend 1 flight of stairs reciprocally wit

## 2019-11-12 ENCOUNTER — APPOINTMENT (OUTPATIENT)
Dept: PHYSICAL THERAPY | Facility: HOSPITAL | Age: 84
End: 2019-11-12
Attending: ORTHOPAEDIC SURGERY
Payer: MEDICARE

## 2019-11-13 ENCOUNTER — OFFICE VISIT (OUTPATIENT)
Dept: PHYSICAL THERAPY | Age: 84
End: 2019-11-13
Attending: FAMILY MEDICINE
Payer: MEDICARE

## 2019-11-13 PROCEDURE — 97110 THERAPEUTIC EXERCISES: CPT

## 2019-11-13 NOTE — PROGRESS NOTES
Diagnosis: L ELGIN anterior approach 10/23/2019   Precautions: surgical  Insurance Type: medicare       Treatment Number: 2/10    Subjective: Reports some minimal L hip muscle soreness with HEP. Using [facility] to ambulate in home, RW outside of house.  Questions

## 2019-11-14 ENCOUNTER — APPOINTMENT (OUTPATIENT)
Dept: PHYSICAL THERAPY | Facility: HOSPITAL | Age: 84
End: 2019-11-14
Attending: ORTHOPAEDIC SURGERY
Payer: MEDICARE

## 2019-11-18 ENCOUNTER — APPOINTMENT (OUTPATIENT)
Dept: PHYSICAL THERAPY | Age: 84
End: 2019-11-18
Attending: FAMILY MEDICINE
Payer: MEDICARE

## 2019-11-19 ENCOUNTER — APPOINTMENT (OUTPATIENT)
Dept: PHYSICAL THERAPY | Age: 84
End: 2019-11-19
Attending: FAMILY MEDICINE
Payer: MEDICARE

## 2019-11-19 ENCOUNTER — APPOINTMENT (OUTPATIENT)
Dept: PHYSICAL THERAPY | Facility: HOSPITAL | Age: 84
End: 2019-11-19
Attending: ORTHOPAEDIC SURGERY
Payer: MEDICARE

## 2019-11-21 ENCOUNTER — OFFICE VISIT (OUTPATIENT)
Dept: PHYSICAL THERAPY | Age: 84
End: 2019-11-21
Attending: FAMILY MEDICINE
Payer: MEDICARE

## 2019-11-21 PROCEDURE — 97110 THERAPEUTIC EXERCISES: CPT

## 2019-11-21 NOTE — PROGRESS NOTES
Diagnosis: L ELGIN anterior approach 10/23/2019   Precautions: surgical  Insurance Type: medicare     Subjective: Reports no hip pain with any current activity. Has some hip muscle ache, fatigue with exercises but self resolves, doesn't activity.  Pleased wi perform car transfers with little to no difficulty  In progress  · Pt will improve hip ABD and ER strength to 4/5 to increase ease with standing and walking  In progress  · Pt will demonstrate improved SLS to > 5 seconds CARLA to promote safety and decrease

## 2019-11-22 ENCOUNTER — APPOINTMENT (OUTPATIENT)
Dept: PHYSICAL THERAPY | Facility: HOSPITAL | Age: 84
End: 2019-11-22
Attending: ORTHOPAEDIC SURGERY
Payer: MEDICARE

## 2019-11-25 ENCOUNTER — APPOINTMENT (OUTPATIENT)
Dept: PHYSICAL THERAPY | Age: 84
End: 2019-11-25
Attending: FAMILY MEDICINE
Payer: MEDICARE

## 2019-11-26 ENCOUNTER — APPOINTMENT (OUTPATIENT)
Dept: PHYSICAL THERAPY | Age: 84
End: 2019-11-26
Attending: FAMILY MEDICINE
Payer: MEDICARE

## 2019-11-26 ENCOUNTER — APPOINTMENT (OUTPATIENT)
Dept: PHYSICAL THERAPY | Facility: HOSPITAL | Age: 84
End: 2019-11-26
Attending: ORTHOPAEDIC SURGERY
Payer: MEDICARE

## 2019-11-29 ENCOUNTER — APPOINTMENT (OUTPATIENT)
Dept: PHYSICAL THERAPY | Facility: HOSPITAL | Age: 84
End: 2019-11-29
Attending: ORTHOPAEDIC SURGERY
Payer: MEDICARE

## 2019-12-02 ENCOUNTER — OFFICE VISIT (OUTPATIENT)
Dept: PHYSICAL THERAPY | Age: 84
End: 2019-12-02
Attending: FAMILY MEDICINE
Payer: MEDICARE

## 2019-12-02 PROCEDURE — 97110 THERAPEUTIC EXERCISES: CPT

## 2019-12-02 NOTE — PROGRESS NOTES
Diagnosis: L ELGIN anterior approach 10/23/2019   Precautions: surgical  Insurance Type: medicare     Subjective: Reports little to no hip pain with any current activity. Tylenol resolves pain.  Has some hip muscle ache, fatigue with exercises but self resol issued.       Goals: (To be met in  10 visits)   · Pt will have improved hip AROM Flex/ABD loreta able to don/doff shoes and perform car transfers with little to no difficulty  In progress  · Pt will improve hip ABD and ER strength to 4/5 to increase ease wit

## 2019-12-03 ENCOUNTER — APPOINTMENT (OUTPATIENT)
Dept: PHYSICAL THERAPY | Facility: HOSPITAL | Age: 84
End: 2019-12-03
Attending: ORTHOPAEDIC SURGERY
Payer: MEDICARE

## 2019-12-04 ENCOUNTER — OFFICE VISIT (OUTPATIENT)
Dept: PHYSICAL THERAPY | Age: 84
End: 2019-12-04
Attending: FAMILY MEDICINE
Payer: MEDICARE

## 2019-12-04 PROCEDURE — 97110 THERAPEUTIC EXERCISES: CPT

## 2019-12-04 NOTE — PROGRESS NOTES
Diagnosis: L ELGIN anterior approach 10/23/2019   Precautions: surgical  Insurance Type: medicare     Subjective:Had f/u with surgeon. Pleased with progress, has been d/c from care. No need for f/u. Cont PT.       Objective    Treatment Number: 5/10      S such as grass  · Pt will perform TUG in < 12 seconds, demonstrating improved gait speed for improved participation in ADL such as community ambulation  · Pt will be able to squat to  light objects around the house with little to no difficulty or itz

## 2019-12-05 ENCOUNTER — APPOINTMENT (OUTPATIENT)
Dept: PHYSICAL THERAPY | Facility: HOSPITAL | Age: 84
End: 2019-12-05
Attending: ORTHOPAEDIC SURGERY
Payer: MEDICARE

## 2019-12-09 ENCOUNTER — OFFICE VISIT (OUTPATIENT)
Dept: PHYSICAL THERAPY | Age: 84
End: 2019-12-09
Attending: FAMILY MEDICINE
Payer: MEDICARE

## 2019-12-09 PROCEDURE — 97110 THERAPEUTIC EXERCISES: CPT

## 2019-12-09 NOTE — PROGRESS NOTES
Diagnosis: L ELGIN anterior approach 10/23/2019   Precautions: surgical  Insurance Type: medicare     Subjective:  Some increased hip muscle soreness with newer HEP. Resolves within a day. No concerns today.      Objective    Treatment Number: 6/10      Beatriz TUG in < 12 seconds, demonstrating improved gait speed for improved participation in ADL such as community ambulation  · Pt will be able to squat to  light objects around the house with little to no difficulty or pain.   In progress  · Pt will improv

## 2019-12-11 ENCOUNTER — OFFICE VISIT (OUTPATIENT)
Dept: PHYSICAL THERAPY | Age: 84
End: 2019-12-11
Attending: FAMILY MEDICINE
Payer: MEDICARE

## 2019-12-11 PROCEDURE — 97110 THERAPEUTIC EXERCISES: CPT

## 2019-12-11 NOTE — PROGRESS NOTES
Diagnosis: L ELGIN anterior approach 10/23/2019   Precautions: surgical  Insurance Type: medicare     Subjective:  Previous c/o hip muscle soreness with HEP as resolved. HEP now with no concerns. Still using cane to ambulate with.      Objective    Treatment standing and walking  In progress  · Pt will demonstrate improved SLS to > 5 seconds CARLA to promote safety and decrease risk of falls on uneven surfaces such as grass  In progress  · Pt will perform TUG in < 12 seconds, demonstrating improved gait speed fo

## 2019-12-16 ENCOUNTER — OFFICE VISIT (OUTPATIENT)
Dept: PHYSICAL THERAPY | Age: 84
End: 2019-12-16
Attending: FAMILY MEDICINE
Payer: MEDICARE

## 2019-12-16 PROCEDURE — 97110 THERAPEUTIC EXERCISES: CPT

## 2019-12-16 NOTE — PROGRESS NOTES
Diagnosis: L ELGIN anterior approach 10/23/2019   Precautions: surgical  Insurance Type: medicare     Subjective:   HEP now with no concerns. Still using cane to ambulate with. Pleased with results of surgery  and rehab.      Objective    Treatment Number: 0 5 seconds CARLA to promote safety and decrease risk of falls on uneven surfaces such as grass  In progress  · Pt will perform TUG in < 12 seconds, demonstrating improved gait speed for improved participation in ADL such as community ambulation  Met

## 2019-12-18 ENCOUNTER — OFFICE VISIT (OUTPATIENT)
Dept: PHYSICAL THERAPY | Age: 84
End: 2019-12-18
Attending: FAMILY MEDICINE
Payer: MEDICARE

## 2019-12-18 PROCEDURE — 97110 THERAPEUTIC EXERCISES: CPT

## 2019-12-18 NOTE — PROGRESS NOTES
Diagnosis: L ELGIN anterior approach 10/23/2019   Precautions: surgical  Insurance Type: medicare     Subjective:   Some of HEP getting easier. Gluteal exercise remains difficult but improving.  Pleased with progress but feels will need to continue pT to pro be met in  10 visits)   · Pt will have improved hip AROM Flex/ABD loreta able to don/doff shoes and perform car transfers with little to no difficulty  In progress  · Pt will improve hip ABD and ER strength to 4/5 to increase ease with standing and walking

## 2019-12-23 ENCOUNTER — APPOINTMENT (OUTPATIENT)
Dept: PHYSICAL THERAPY | Age: 84
End: 2019-12-23
Attending: FAMILY MEDICINE
Payer: MEDICARE

## 2019-12-26 ENCOUNTER — APPOINTMENT (OUTPATIENT)
Dept: PHYSICAL THERAPY | Age: 84
End: 2019-12-26
Attending: FAMILY MEDICINE
Payer: MEDICARE

## 2019-12-30 ENCOUNTER — APPOINTMENT (OUTPATIENT)
Dept: PHYSICAL THERAPY | Age: 84
End: 2019-12-30
Attending: FAMILY MEDICINE
Payer: MEDICARE

## 2020-01-02 ENCOUNTER — TELEPHONE (OUTPATIENT)
Dept: FAMILY MEDICINE CLINIC | Facility: CLINIC | Age: 85
End: 2020-01-02

## 2020-01-08 ENCOUNTER — OFFICE VISIT (OUTPATIENT)
Dept: FAMILY MEDICINE CLINIC | Facility: CLINIC | Age: 85
End: 2020-01-08
Payer: MEDICARE

## 2020-01-08 VITALS
SYSTOLIC BLOOD PRESSURE: 128 MMHG | HEIGHT: 61 IN | DIASTOLIC BLOOD PRESSURE: 64 MMHG | HEART RATE: 68 BPM | WEIGHT: 118.38 LBS | BODY MASS INDEX: 22.35 KG/M2 | RESPIRATION RATE: 16 BRPM | TEMPERATURE: 99 F

## 2020-01-08 DIAGNOSIS — Z13.31 DEPRESSION SCREENING: ICD-10-CM

## 2020-01-08 DIAGNOSIS — I10 ESSENTIAL HYPERTENSION: ICD-10-CM

## 2020-01-08 DIAGNOSIS — Z96.642 HISTORY OF LEFT HIP REPLACEMENT: ICD-10-CM

## 2020-01-08 DIAGNOSIS — I63.9 CEREBROVASCULAR ACCIDENT (CVA), UNSPECIFIED MECHANISM (HCC): ICD-10-CM

## 2020-01-08 DIAGNOSIS — E78.00 PURE HYPERCHOLESTEROLEMIA: ICD-10-CM

## 2020-01-08 DIAGNOSIS — F32.5 MAJOR DEPRESSION IN REMISSION (HCC): ICD-10-CM

## 2020-01-08 DIAGNOSIS — Z00.00 ENCOUNTER FOR ANNUAL HEALTH EXAMINATION: Primary | ICD-10-CM

## 2020-01-08 PROCEDURE — G0444 DEPRESSION SCREEN ANNUAL: HCPCS | Performed by: FAMILY MEDICINE

## 2020-01-08 PROCEDURE — G0439 PPPS, SUBSEQ VISIT: HCPCS | Performed by: FAMILY MEDICINE

## 2020-01-08 NOTE — PATIENT INSTRUCTIONS
Ryan Callahan's SCREENING SCHEDULE   Tests on this list are recommended by your physician but may not be covered, or covered at this frequency, by your insurer. Please check with your insurance carrier before scheduling to verify coverage.    P to patients who meet one of the following criteria:   • Men who are 73-68 years old and have smoked more than 100 cigarettes in their lifetime   • Anyone with a family history    Colorectal Cancer Screening  Covered up to Age 76     Colonoscopy Screen   Co Immunizations      Influenza  Covered Annually No orders found for this or any previous visit. Please get every year    Pneumococcal 13 (Prevnar)  Covered Once after 65 No orders found for this or any previous visit.  Please get once after your 65th birth

## 2020-01-08 NOTE — PROGRESS NOTES
HPI:   Sobeida Charles is a 80year old female who presents for a Medicare Subsequent Annual Wellness visit (Pt already had Initial Annual Wellness). Preventative  Breast: not interested in mammogram  Colon: n/a -no longer recommended.    Pap: n scanning into Epic. She does have a POA but we do NOT have it on file in [address] [place] [address] Rd. The patient has this document but we do not have it in Epic, and patient is instructed to get our office a copy of it for scanning into Epic.            She smoked toba Component Value Date    CHOLEST 178 01/07/2019    HDL 63 (H) 01/07/2019    LDL 93 01/07/2019    TRIG 108 01/07/2019          Last Chemistry Labs:   Lab Results   Component Value Date    AST 19 10/03/2019    ALT 17 10/03/2019    CA 9.6 10/25/2019    ALB 3 She  reports that she quit smoking about 42 years ago. She has a 8.00 pack-year smoking history. She has never used smokeless tobacco. She reports current alcohol use. She reports that she does not use drugs.      REVIEW OF SYSTEMS:     Constitutional: ne Abdomen:   Soft, non-tender, bowel sounds active all four quadrants,  no masses, no organomegaly   Extremities: Extremities normal, atraumatic, no cyanosis or edema   Pulses: 2+ and symmetric   Skin: Skin color, texture, turgor normal, no rashes or lesio usha            Diet assessment: good     PLAN:  The patient indicates understanding of these issues and agrees to the plan. Reinforced healthy diet, lifestyle, and exercise. Return in about 1 year (around 1/8/2021) for Medicare Wellness visit.      J Density Screening      Dexascan Every two years No results found for this or any previous visit. No flowsheet data found.     Pap and Pelvic      Pap: Every 3 yrs age 21-65 or Pap+HPV every 5 yrs age 33-67, age 72 and older at high risk There are no prevent Creatinine  Annually CREATININE (mg/dL)   Date Value   12/16/2016 0.96   03/16/2011 0.9     Creatinine (mg/dL)   Date Value   10/25/2019 0.93    No flowsheet data found.     Drug Serum Conc  Annually No results found for: DIGOXIN, DIG, VALP No flowsheet

## 2020-01-14 ENCOUNTER — APPOINTMENT (OUTPATIENT)
Dept: PHYSICAL THERAPY | Age: 85
End: 2020-01-14
Attending: FAMILY MEDICINE
Payer: MEDICARE

## 2020-01-14 NOTE — TELEPHONE ENCOUNTER
Refill request for:    Requested Prescriptions     Pending Prescriptions Disp Refills   • escitalopram 10 MG Oral Tab 901 tablet      Sig: Take 1 tablet (10 mg total) by mouth daily.       LOV 1/8/2020     Patient was asked to follow-up in: one year    Appo

## 2020-01-14 NOTE — TELEPHONE ENCOUNTER
Pt daughter-in-law calling she needs her omeprazole 20 MG Oral Capsule Delayed Release and escitalopram 10 MG Oral Tab sent to the mail order [facility] she has about a little over a week left.

## 2020-01-15 RX ORDER — OMEPRAZOLE 20 MG/1
20 CAPSULE, DELAYED RELEASE ORAL EVERY MORNING
Qty: 90 CAPSULE | Refills: 3 | Status: SHIPPED | OUTPATIENT
Start: 2020-01-15 | End: 2021-01-20

## 2020-01-15 RX ORDER — ESCITALOPRAM OXALATE 10 MG/1
10 TABLET ORAL DAILY
Qty: 90 TABLET | Refills: 3 | Status: SHIPPED | OUTPATIENT
Start: 2020-01-15 | End: 2021-01-20

## 2020-01-15 NOTE — TELEPHONE ENCOUNTER
Refill request for:    Requested Prescriptions     Pending Prescriptions Disp Refills   • omeprazole 20 MG Oral Capsule Delayed Release 90 capsule 3     Sig: Take 1 capsule (20 mg total) by mouth every morning.         Last Prescribed Quantity Refills   1/7

## 2020-01-16 ENCOUNTER — APPOINTMENT (OUTPATIENT)
Dept: PHYSICAL THERAPY | Age: 85
End: 2020-01-16
Attending: FAMILY MEDICINE
Payer: MEDICARE

## 2020-01-20 ENCOUNTER — OFFICE VISIT (OUTPATIENT)
Dept: PHYSICAL THERAPY | Age: 85
End: 2020-01-20
Attending: FAMILY MEDICINE
Payer: MEDICARE

## 2020-01-20 PROCEDURE — 97110 THERAPEUTIC EXERCISES: CPT

## 2020-01-20 NOTE — PROGRESS NOTES
Diagnosis: L ELGIN anterior approach 10/23/2019   Precautions: surgical  Insurance Type: medicare     Physical Therapy Progress Report  10 sessions completed     Subjective:   Pt returns to therapy.   She has missed the last 4 weeks of therapy due to respirat ambulate with. Requests to continue tx for 6 more sessions to further decrease deficit, address unmet goals. Pt agrees with POC to continue therapy. Please co sign note so pt continue tx. Thank you.      30 sec sit to stand test, no UE assist  9 reps (

## 2020-01-22 ENCOUNTER — OFFICE VISIT (OUTPATIENT)
Dept: PHYSICAL THERAPY | Age: 85
End: 2020-01-22
Attending: FAMILY MEDICINE
Payer: MEDICARE

## 2020-01-22 PROCEDURE — 97110 THERAPEUTIC EXERCISES: CPT

## 2020-01-22 NOTE — PROGRESS NOTES
Diagnosis: L ELGIN anterior approach 10/23/2019   Precautions: surgical  Insurance Type: medicare     Subjective:  Note co signed to cont PT. Reports hip not as stiff or weak feeling since return to tx and HEP.      Objective     Treatment Number: 10/ 14-16 increase ease with standing and walking  In progress  · Pt will demonstrate improved SLS to > 5 seconds CARLA to promote safety and decrease risk of falls on uneven surfaces such as grass  In progress  · Pt will perform TUG in < 12 seconds, demonstrating imp

## 2020-01-27 ENCOUNTER — APPOINTMENT (OUTPATIENT)
Dept: PHYSICAL THERAPY | Age: 85
End: 2020-01-27
Attending: FAMILY MEDICINE
Payer: MEDICARE

## 2020-01-29 ENCOUNTER — OFFICE VISIT (OUTPATIENT)
Dept: PHYSICAL THERAPY | Age: 85
End: 2020-01-29
Attending: FAMILY MEDICINE
Payer: MEDICARE

## 2020-01-29 PROCEDURE — 97110 THERAPEUTIC EXERCISES: CPT

## 2020-01-29 NOTE — PROGRESS NOTES
Diagnosis: L ELGIN anterior approach 10/23/2019   Precautions: surgical  Insurance Type: medicare     Subjective:  Is amb independently with short trips out of home, feels steady, safe. Does this when accompanied by someone and weather is good.      Objectiv perform car transfers with little to no difficulty  Met  · Pt will improve hip ABD and ER strength to 4/5 to increase ease with standing and walking  In progress  · Pt will demonstrate improved SLS to > 5 seconds CARLA to promote safety and decrease risk of

## 2020-02-05 ENCOUNTER — OFFICE VISIT (OUTPATIENT)
Dept: PHYSICAL THERAPY | Age: 85
End: 2020-02-05
Attending: FAMILY MEDICINE
Payer: MEDICARE

## 2020-02-05 PROCEDURE — 97110 THERAPEUTIC EXERCISES: CPT

## 2020-02-05 NOTE — PROGRESS NOTES
Diagnosis: L ELGIN anterior approach 10/23/2019   Precautions: surgical  Insurance Type: medicare     Subjective: No longer suing cane with all mobility. Uses rail for assist when amb stairs reciprocally ascend/descend. [place].   Recently amb  to increase ease with standing and walking  In progress  · Pt will demonstrate improved SLS to > 5 seconds CARLA to promote safety and decrease risk of falls on uneven surfaces such as grass  In progress  · Pt will perform TUG in < 12 seconds, demonstrating

## 2020-02-11 ENCOUNTER — APPOINTMENT (OUTPATIENT)
Dept: PHYSICAL THERAPY | Age: 85
End: 2020-02-11
Attending: FAMILY MEDICINE
Payer: MEDICARE

## 2020-02-13 ENCOUNTER — OFFICE VISIT (OUTPATIENT)
Dept: PHYSICAL THERAPY | Age: 85
End: 2020-02-13
Attending: FAMILY MEDICINE
Payer: MEDICARE

## 2020-02-13 PROCEDURE — 97110 THERAPEUTIC EXERCISES: CPT

## 2020-02-13 NOTE — PROGRESS NOTES
Diagnosis: L ELGIN anterior approach 10/23/2019   Precautions: surgical  Insurance Type: medicare     Subjective: No longer suing cane with all mobility. Uses rail for assist when amb stairs reciprocally ascend/descend. [place].   Recently amb  increase ease with standing and walking  In progress  · Pt will demonstrate improved SLS to > 5 seconds CARLA to promote safety and decrease risk of falls on uneven surfaces such as grass  In progress  · Pt will perform TUG in < 12 seconds, demonstrating imp

## 2020-02-19 ENCOUNTER — APPOINTMENT (OUTPATIENT)
Dept: PHYSICAL THERAPY | Age: 85
End: 2020-02-19
Attending: FAMILY MEDICINE
Payer: MEDICARE

## 2020-02-24 ENCOUNTER — OFFICE VISIT (OUTPATIENT)
Dept: PHYSICAL THERAPY | Age: 85
End: 2020-02-24
Attending: FAMILY MEDICINE
Payer: MEDICARE

## 2020-02-24 PROCEDURE — 97110 THERAPEUTIC EXERCISES: CPT

## 2020-02-24 NOTE — PROGRESS NOTES
Diagnosis: L ELGIN anterior approach 10/23/2019   Precautions: surgical  Insurance Type: medicare     Subjective: Pt reports that she has been experiencing L hip and posterior and lateral thigh ache/pain, sporadic \"tingling\".   Begins when waking in morning able to don/doff shoes and perform car transfers with little to no difficulty  Met  · Pt will improve hip ABD and ER strength to 4/5 to increase ease with standing and walking  In progress  · Pt will demonstrate improved SLS to > 5 seconds CARLA to promote s

## 2020-03-03 ENCOUNTER — OFFICE VISIT (OUTPATIENT)
Dept: PHYSICAL THERAPY | Age: 85
End: 2020-03-03
Attending: FAMILY MEDICINE
Payer: MEDICARE

## 2020-03-03 PROCEDURE — 97110 THERAPEUTIC EXERCISES: CPT

## 2020-03-03 NOTE — PROGRESS NOTES
Diagnosis: L ELGIN anterior approach 10/23/2019   Precautions: surgical  Insurance Type: medicare     Subjective: Reports recent c/o referred pain t L buttocks and upper leg has greatly subsided. States she is 80% improved.   No longer has pain at night, sle improved hip AROM Flex/ABD to be able to don/doff shoes and perform car transfers with little to no difficulty  Met  · Pt will improve hip ABD and ER strength to 4/5 to increase ease with standing and walking  In progress  · Pt will demonstrate improved SL

## 2020-03-09 ENCOUNTER — OFFICE VISIT (OUTPATIENT)
Dept: PHYSICAL THERAPY | Age: 85
End: 2020-03-09
Attending: FAMILY MEDICINE
Payer: MEDICARE

## 2020-03-09 PROCEDURE — 97110 THERAPEUTIC EXERCISES: CPT

## 2020-03-09 NOTE — PROGRESS NOTES
Diagnosis: L ELGIN anterior approach 10/23/2019   Precautions: surgical  Insurance Type: medicare     Physical Therapy Discharge Report  16 sessions completed     Subjective: Pt reports recent episodes of L back pain with referred L thigh/leg pain has greatl deformity, abduction weakness. Pt will continue to use [facility] for ambulating outside of home. No further sessions remain approved.  If pt does not fully resolve remaining back complaints she will f/u with PCP for orders for PT versus further testing by MD.  No

## 2020-03-10 ENCOUNTER — TELEPHONE (OUTPATIENT)
Dept: FAMILY MEDICINE CLINIC | Facility: CLINIC | Age: 85
End: 2020-03-10

## 2020-03-10 NOTE — TELEPHONE ENCOUNTER
Patient is calling requesting additional visits for physical therapy. Physical therapist feels she needs more visits.

## 2020-03-10 NOTE — TELEPHONE ENCOUNTER
Per PT notes she was told to follow-up with PCP if she needs additional PT for her back pain.    Patient has made appt with Dr. Leonidas Field M.D.

## 2020-03-12 ENCOUNTER — OFFICE VISIT (OUTPATIENT)
Dept: FAMILY MEDICINE CLINIC | Facility: CLINIC | Age: 85
End: 2020-03-12
Payer: MEDICARE

## 2020-03-12 VITALS
DIASTOLIC BLOOD PRESSURE: 68 MMHG | HEART RATE: 64 BPM | HEIGHT: 62 IN | TEMPERATURE: 98 F | BODY MASS INDEX: 22.97 KG/M2 | SYSTOLIC BLOOD PRESSURE: 134 MMHG | RESPIRATION RATE: 16 BRPM | WEIGHT: 124.81 LBS

## 2020-03-12 DIAGNOSIS — Z96.642 HISTORY OF LEFT HIP REPLACEMENT: ICD-10-CM

## 2020-03-12 DIAGNOSIS — M48.061 SPINAL STENOSIS OF LUMBAR REGION AT MULTIPLE LEVELS: Primary | ICD-10-CM

## 2020-03-12 PROCEDURE — 99213 OFFICE O/P EST LOW 20 MIN: CPT | Performed by: FAMILY MEDICINE

## 2020-03-12 NOTE — PROGRESS NOTES
Patient presents with:  Back Pain: Pt requesting more PT for pain     HPI:   Gail Mosley is a 80year old female with PMH stroke, hip replacement 10/2019 who presents to the office for f/u back. Known lumbar spinal stenosis.   She has been doi

## 2020-03-17 ENCOUNTER — APPOINTMENT (OUTPATIENT)
Dept: PHYSICAL THERAPY | Age: 85
End: 2020-03-17
Attending: FAMILY MEDICINE
Payer: MEDICARE

## 2020-06-22 NOTE — PROGRESS NOTES
Addended by: Delfina CORDOVA on: 6/22/2020 10:09 AM     Modules accepted: Orders
Dx: Spinal stenosis of lumbar region without neurogenic claudication (M48.061)           Authorized # of Visits:  8         Next MD visit: none scheduled  Fall Risk: standard         Precautions: n/a             Subjective: Felt really looser after last se
[facility]

## 2020-06-29 ENCOUNTER — OFFICE VISIT (OUTPATIENT)
Dept: PHYSICAL THERAPY | Age: 85
End: 2020-06-29
Attending: FAMILY MEDICINE
Payer: MEDICARE

## 2020-06-29 DIAGNOSIS — M48.061 SPINAL STENOSIS OF LUMBAR REGION AT MULTIPLE LEVELS: ICD-10-CM

## 2020-06-29 DIAGNOSIS — Z96.642 HISTORY OF LEFT HIP REPLACEMENT: ICD-10-CM

## 2020-06-29 PROCEDURE — 97161 PT EVAL LOW COMPLEX 20 MIN: CPT

## 2020-06-29 PROCEDURE — 97110 THERAPEUTIC EXERCISES: CPT

## 2020-06-30 NOTE — PROGRESS NOTES
SPINE EVALUATION:   Referring Physician: Dr. Gray Getting  Diagnosis: Lumbar spine strain    Date of Service: 6/29/2020     PATIENT SUMMARY   Mandy Rivero is a 80year old female who presents to therapy today with complaints of intermittent  back pa ambulate safely in the community due to declined activity. The results of the objective tests and measureo show lumbar spine ROM WFL's, minimal decrease in L hip and trunk strength, negative neuro screen.   Functional deficits include but are not limited to OF CARE:    · Goals: (to be met in 8-10 visits)   · Pt will have improved lumbar spine AROM Flex to be able to don/doff shoes and perform car transfers without difficulty  · Pt will improve hip ABD and ER strength to 4/5 or > to increase ease with standing

## 2020-07-01 ENCOUNTER — OFFICE VISIT (OUTPATIENT)
Dept: PHYSICAL THERAPY | Age: 85
End: 2020-07-01
Attending: FAMILY MEDICINE
Payer: MEDICARE

## 2020-07-01 PROCEDURE — 97110 THERAPEUTIC EXERCISES: CPT

## 2020-07-01 PROCEDURE — 97112 NEUROMUSCULAR REEDUCATION: CPT

## 2020-07-02 NOTE — PROGRESS NOTES
Diagnosis: L Lumbar Spine Strain     Precautions: L ELGIN    Treatment Number: 2/8-10    Subjective:  Complaint of intermittent  back pain and referred L hip and thigh pain. Symptoms began a few months ago. Have improved since onset.   Has some concerns about compliant with comprehensive HEP to maintain progress achieved in PT    Plan: progress with above program, address goals    Total Timed Treatment: 45 min  Total Treatment time: 45 min  Charges: there ex 1 nm re ed 2

## 2020-07-06 ENCOUNTER — OFFICE VISIT (OUTPATIENT)
Dept: PHYSICAL THERAPY | Age: 85
End: 2020-07-06
Attending: FAMILY MEDICINE
Payer: MEDICARE

## 2020-07-06 PROCEDURE — 97110 THERAPEUTIC EXERCISES: CPT

## 2020-07-06 PROCEDURE — 97112 NEUROMUSCULAR REEDUCATION: CPT

## 2020-07-06 NOTE — PROGRESS NOTES
Diagnosis: L Lumbar Spine Strain     Precautions: L ELGIN    Treatment Number: 3/8-10    Subjective: Daily walks have been restricted recently due to severe heat. Does daily exercise at home but otherwise little activity.  Mild intermittent L back but no long stand test demonstrating improved LE functional strength and control.     · Pt will improve functional hip strength to report ability to ascend/descend 1 flight of stairs reciprocally without use of handrail  · Pt will be independent and compliant with comp

## 2020-07-08 ENCOUNTER — OFFICE VISIT (OUTPATIENT)
Dept: PHYSICAL THERAPY | Age: 85
End: 2020-07-08
Attending: FAMILY MEDICINE
Payer: MEDICARE

## 2020-07-08 PROCEDURE — 97112 NEUROMUSCULAR REEDUCATION: CPT

## 2020-07-08 PROCEDURE — 97110 THERAPEUTIC EXERCISES: CPT

## 2020-07-08 NOTE — PROGRESS NOTES
Diagnosis: L Lumbar Spine Strain     Precautions: L ELGIN    Treatment Number: 4/8-10    Subjective: States she went to grocery store today for about an hour. Had minor L hip and back ache/faitgue by end of shopping. Doing HEP. Objective:     There ex: 20 seconds each to promote safety and decrease risk of falls on uneven surfaces such as grass  · Pt will perform TUG in < 11 seconds, demonstrating improved gait speed for improved participation in ADL such as community ambulation  · Pt will be able to perfor

## 2020-07-13 ENCOUNTER — OFFICE VISIT (OUTPATIENT)
Dept: PHYSICAL THERAPY | Age: 85
End: 2020-07-13
Attending: FAMILY MEDICINE
Payer: MEDICARE

## 2020-07-13 PROCEDURE — 97112 NEUROMUSCULAR REEDUCATION: CPT

## 2020-07-13 PROCEDURE — 97110 THERAPEUTIC EXERCISES: CPT

## 2020-07-13 NOTE — PROGRESS NOTES
Diagnosis: L Lumbar Spine Strain     Precautions: L ELGIN    Treatment Number: 5/8-10     Subjective: Some hip and back stiffness remains, more so in the morning. Has bike at home she uses [employer]. Still not doing regular walking due to heat.       Objective: strength to 4/5 or > to increase ease with standing and walking in progress  · Pt will demonstrate improved SLS to >5 seconds each to promote safety and decrease risk of falls on uneven surfaces such as grass in progress  · Pt will perform TUG in < 11 seco

## 2020-07-15 ENCOUNTER — OFFICE VISIT (OUTPATIENT)
Dept: PHYSICAL THERAPY | Age: 85
End: 2020-07-15
Attending: FAMILY MEDICINE
Payer: MEDICARE

## 2020-07-15 PROCEDURE — 97112 NEUROMUSCULAR REEDUCATION: CPT

## 2020-07-15 PROCEDURE — 97110 THERAPEUTIC EXERCISES: CPT

## 2020-07-15 NOTE — PROGRESS NOTES
Diagnosis: L Lumbar Spine Strain     Precautions: L ELGIN    Treatment Number: 5/8-10     Subjective: Morning stiffness better with new HEP. Stairs at home w/o concerns, uses single rail for assist.  No pain with walking in stores and in the community. falls on uneven surfaces such as grass in progress  · Pt will perform TUG in < 11 seconds, demonstrating improved gait speed for improved participation in ADL such as community ambulation met  · Pt will be able to perform 8 reps or more with the sit to sta

## 2020-07-20 ENCOUNTER — APPOINTMENT (OUTPATIENT)
Dept: PHYSICAL THERAPY | Age: 85
End: 2020-07-20
Attending: FAMILY MEDICINE
Payer: MEDICARE

## 2020-07-22 ENCOUNTER — APPOINTMENT (OUTPATIENT)
Dept: PHYSICAL THERAPY | Age: 85
End: 2020-07-22
Attending: FAMILY MEDICINE
Payer: MEDICARE

## 2020-07-23 ENCOUNTER — TELEPHONE (OUTPATIENT)
Dept: PHYSICAL THERAPY | Facility: HOSPITAL | Age: 85
End: 2020-07-23

## 2020-07-27 ENCOUNTER — APPOINTMENT (OUTPATIENT)
Dept: PHYSICAL THERAPY | Age: 85
End: 2020-07-27
Attending: FAMILY MEDICINE
Payer: MEDICARE

## 2020-07-29 ENCOUNTER — APPOINTMENT (OUTPATIENT)
Dept: PHYSICAL THERAPY | Age: 85
End: 2020-07-29
Attending: FAMILY MEDICINE
Payer: MEDICARE

## 2020-07-30 ENCOUNTER — TELEPHONE (OUTPATIENT)
Dept: PHYSICAL THERAPY | Age: 85
End: 2020-07-30

## 2020-08-03 ENCOUNTER — APPOINTMENT (OUTPATIENT)
Dept: PHYSICAL THERAPY | Age: 85
End: 2020-08-03
Attending: ORTHOPAEDIC SURGERY
Payer: MEDICARE

## 2020-08-05 ENCOUNTER — APPOINTMENT (OUTPATIENT)
Dept: PHYSICAL THERAPY | Age: 85
End: 2020-08-05
Attending: ORTHOPAEDIC SURGERY
Payer: MEDICARE

## 2020-08-10 ENCOUNTER — OFFICE VISIT (OUTPATIENT)
Dept: PHYSICAL THERAPY | Age: 85
End: 2020-08-10
Attending: ORTHOPAEDIC SURGERY
Payer: MEDICARE

## 2020-08-10 PROCEDURE — 97110 THERAPEUTIC EXERCISES: CPT

## 2020-08-10 PROCEDURE — 97112 NEUROMUSCULAR REEDUCATION: CPT

## 2020-08-10 NOTE — PROGRESS NOTES
Diagnosis: L Lumbar Spine Strain     Precautions: L ELGIN    Physical Therapy Discharge Report  6 sessions completed     Subjective: Pt reports that she has no back or hip pain or stiffness with any current activity.  Is pleased with her progress and is ready and ER strength to 4/5 or > to increase ease with standing and walking met  · Pt will demonstrate improved SLS to >5 seconds each to promote safety and decrease risk of falls on uneven surfaces such as grass Not met  · Pt will perform TUG in < 11 seconds,

## 2020-08-17 ENCOUNTER — APPOINTMENT (OUTPATIENT)
Dept: PHYSICAL THERAPY | Age: 85
End: 2020-08-17
Attending: ORTHOPAEDIC SURGERY
Payer: MEDICARE

## 2021-01-20 ENCOUNTER — OFFICE VISIT (OUTPATIENT)
Dept: FAMILY MEDICINE CLINIC | Facility: CLINIC | Age: 86
End: 2021-01-20
Payer: MEDICARE

## 2021-01-20 VITALS
TEMPERATURE: 98 F | DIASTOLIC BLOOD PRESSURE: 76 MMHG | WEIGHT: 138.81 LBS | RESPIRATION RATE: 16 BRPM | HEIGHT: 60.25 IN | BODY MASS INDEX: 26.9 KG/M2 | HEART RATE: 72 BPM | SYSTOLIC BLOOD PRESSURE: 136 MMHG | OXYGEN SATURATION: 98 %

## 2021-01-20 DIAGNOSIS — I10 ESSENTIAL HYPERTENSION: ICD-10-CM

## 2021-01-20 DIAGNOSIS — Z86.73 HISTORY OF ISCHEMIC STROKE: ICD-10-CM

## 2021-01-20 DIAGNOSIS — Z51.81 MONITORING FOR ANTICOAGULANT USE: ICD-10-CM

## 2021-01-20 DIAGNOSIS — K21.9 GASTROESOPHAGEAL REFLUX DISEASE, UNSPECIFIED WHETHER ESOPHAGITIS PRESENT: Chronic | ICD-10-CM

## 2021-01-20 DIAGNOSIS — E78.00 PURE HYPERCHOLESTEROLEMIA: ICD-10-CM

## 2021-01-20 DIAGNOSIS — Z00.00 ENCOUNTER FOR ANNUAL HEALTH EXAMINATION: Primary | ICD-10-CM

## 2021-01-20 DIAGNOSIS — F32.5 MAJOR DEPRESSION IN REMISSION (HCC): ICD-10-CM

## 2021-01-20 DIAGNOSIS — F41.9 ANXIETY: ICD-10-CM

## 2021-01-20 DIAGNOSIS — Z13.31 DEPRESSION SCREENING: ICD-10-CM

## 2021-01-20 DIAGNOSIS — Z79.01 MONITORING FOR ANTICOAGULANT USE: ICD-10-CM

## 2021-01-20 DIAGNOSIS — Q21.1 PATENT FORAMEN OVALE: Chronic | ICD-10-CM

## 2021-01-20 PROCEDURE — G0444 DEPRESSION SCREEN ANNUAL: HCPCS | Performed by: FAMILY MEDICINE

## 2021-01-20 PROCEDURE — G0439 PPPS, SUBSEQ VISIT: HCPCS | Performed by: FAMILY MEDICINE

## 2021-01-20 RX ORDER — OMEPRAZOLE 20 MG/1
20 CAPSULE, DELAYED RELEASE ORAL EVERY MORNING
Qty: 90 CAPSULE | Refills: 3 | Status: SHIPPED | OUTPATIENT
Start: 2021-01-20 | End: 2021-06-15

## 2021-01-20 RX ORDER — ESCITALOPRAM OXALATE 10 MG/1
15 TABLET ORAL DAILY
Qty: 135 TABLET | Refills: 3 | Status: SHIPPED | OUTPATIENT
Start: 2021-01-20 | End: 2022-01-15

## 2021-01-20 NOTE — PROGRESS NOTES
HPI:   Yanet Kraft is a 80year old female who presents for a Medicare Subsequent Annual Wellness visit (Pt already had Initial Annual Wellness).     Preventative  Breast: not interested  Colon: no longer recommended screening   Pap: n/a   Immu below:  She has Hearing problems based on screening of functional status. Hearing Problems?: Yes  She has problems with Memory based on screening of functional status.    Memory Problems?: Yes       Depression Screening (PHQ-2/PHQ-9): Over the LAST 2 WEEK of bleeding gastric ulcer 2010     Major depression in remission ([facility] [address]     Arthritis of knee, right     Esophageal reflux     Patent foramen ovale     Primary osteoarthritis of left hip     History of ischemic stroke    Wt Readings from Last 3 Encounters: (current) use of anticoagulants, Muscle cramps, Osteoarthritis, Patent foramen ovale, Spinal stenosis, Spinal stenosis (2019), Stroke ([facility] [address] (2006), Transient cerebral ischemia (2006), and Visual impairment.     She  has a past surgical history that includes clear, EOM's intact   Back:   Symmetric, no curvature, ROM normal, no CVA tenderness   Lungs:   Clear to auscultation bilaterally, respirations unlabored   Chest Wall:  No tenderness or deformity   Heart:  Regular rate and rhythm, S1, S2 normal, no murmur, Major depression in remission [facility])  Moods ok - a little down in light of the pandemic, world around her. Will increase to 15mg lexapro. - escitalopram 10 MG Oral Tab; Take 1.5 tablets (15 mg total) by mouth daily. Dispense: 135 tablet; Refill: 3    8. medically necessary Electrocardiogram date10/26/2020       Colorectal Cancer Screening      Colonoscopy Screen every 10 years There are no preventive care reminders to display for this patient.  Update Health Maintenance if applicable    Flex Sigmoidoscopy This may be covered with your prescription benefits, but Medicare does not cover unless Medically needed    Zoster  Not covered by Medicare Part B No vaccine history found This may be covered with your pharmacy  prescription benefits      SPECIFIC DISEASE

## 2021-01-20 NOTE — PATIENT INSTRUCTIONS
Ryan Callahan's SCREENING SCHEDULE   Tests on this list are recommended by your physician but may not be covered, or covered at this frequency, by your insurer. Please check with your insurance carrier before scheduling to verify coverage.    P to patients who meet one of the following criteria:   • Men who are 73-68 years old and have smoked more than 100 cigarettes in their lifetime   • Anyone with a family history    Colorectal Cancer Screening  Covered up to Age 76     Colonoscopy Screen   Co Immunizations      Influenza  Covered Annually Orders placed or performed in visit on 09/11/20   • FLU VACC HIGH DOSE PRSV FREE    Please get every year    Pneumococcal 13 (Prevnar)  Covered Once after 65 No orders found for this or any previous visit.  P

## 2021-02-02 DIAGNOSIS — Z23 NEED FOR VACCINATION: ICD-10-CM

## 2021-02-07 ENCOUNTER — IMMUNIZATION (OUTPATIENT)
Dept: LAB | Age: 86
End: 2021-02-07
Attending: HOSPITALIST
Payer: MEDICARE

## 2021-02-07 DIAGNOSIS — Z23 NEED FOR VACCINATION: Primary | ICD-10-CM

## 2021-02-07 PROCEDURE — 0001A SARSCOV2 VAC 30MCG/0.3ML IM: CPT

## 2021-02-28 ENCOUNTER — IMMUNIZATION (OUTPATIENT)
Dept: LAB | Age: 86
End: 2021-02-28
Attending: HOSPITALIST
Payer: MEDICARE

## 2021-02-28 DIAGNOSIS — Z23 NEED FOR VACCINATION: Primary | ICD-10-CM

## 2021-02-28 PROCEDURE — 0002A SARSCOV2 VAC 30MCG/0.3ML IM: CPT

## 2021-03-25 ENCOUNTER — TELEPHONE (OUTPATIENT)
Dept: FAMILY MEDICINE CLINIC | Facility: CLINIC | Age: 86
End: 2021-03-25

## 2021-06-15 DIAGNOSIS — K21.9 GASTROESOPHAGEAL REFLUX DISEASE, UNSPECIFIED WHETHER ESOPHAGITIS PRESENT: Chronic | ICD-10-CM

## 2021-06-15 RX ORDER — OMEPRAZOLE 20 MG/1
20 CAPSULE, DELAYED RELEASE ORAL EVERY MORNING
Qty: 30 CAPSULE | Refills: 0 | Status: SHIPPED | OUTPATIENT
Start: 2021-06-15

## 2021-06-15 NOTE — TELEPHONE ENCOUNTER
Pt lost her omeprazole 20 MG Oral Capsule Delayed Release and she ordered more from her mail order but she is going away for the weekend and she is wondering if she can get a few pills till her Rx gets there.

## 2021-06-15 NOTE — TELEPHONE ENCOUNTER
Called patient she is using [facility] on [address] pended short order to be sent by Dr Arabella Sullivan

## 2021-06-17 RX ORDER — OMEPRAZOLE 20 MG/1
CAPSULE, DELAYED RELEASE ORAL
Qty: 90 CAPSULE | Refills: 0 | OUTPATIENT
Start: 2021-06-17

## 2021-06-28 ENCOUNTER — OFFICE VISIT (OUTPATIENT)
Dept: FAMILY MEDICINE CLINIC | Facility: CLINIC | Age: 86
End: 2021-06-28
Payer: MEDICARE

## 2021-06-28 VITALS
BODY MASS INDEX: 24.73 KG/M2 | DIASTOLIC BLOOD PRESSURE: 60 MMHG | HEIGHT: 62 IN | WEIGHT: 134.38 LBS | SYSTOLIC BLOOD PRESSURE: 110 MMHG | OXYGEN SATURATION: 98 % | HEART RATE: 84 BPM

## 2021-06-28 DIAGNOSIS — K59.00 CONSTIPATION, UNSPECIFIED CONSTIPATION TYPE: ICD-10-CM

## 2021-06-28 DIAGNOSIS — E86.0 DEHYDRATION: Primary | ICD-10-CM

## 2021-06-28 PROCEDURE — 99213 OFFICE O/P EST LOW 20 MIN: CPT | Performed by: FAMILY MEDICINE

## 2021-06-28 NOTE — PROGRESS NOTES
Patient presents with:  Stool: Blood, Pain     HPI:   Florence Lund is a 80year old female who presents to the office for discussion of GI issue.s  Have been using metamucil for their bowels and this helped keep them regular.    This has been le

## 2021-07-23 ENCOUNTER — OFFICE VISIT (OUTPATIENT)
Dept: FAMILY MEDICINE CLINIC | Facility: CLINIC | Age: 86
End: 2021-07-23
Payer: MEDICARE

## 2021-07-23 VITALS
DIASTOLIC BLOOD PRESSURE: 64 MMHG | BODY MASS INDEX: 26.89 KG/M2 | TEMPERATURE: 98 F | HEART RATE: 68 BPM | WEIGHT: 133.38 LBS | RESPIRATION RATE: 16 BRPM | HEIGHT: 59 IN | SYSTOLIC BLOOD PRESSURE: 138 MMHG

## 2021-07-23 DIAGNOSIS — M54.42 ACUTE LEFT-SIDED LOW BACK PAIN WITH LEFT-SIDED SCIATICA: ICD-10-CM

## 2021-07-23 DIAGNOSIS — K59.09 CHRONIC CONSTIPATION: Primary | ICD-10-CM

## 2021-07-23 PROCEDURE — 99214 OFFICE O/P EST MOD 30 MIN: CPT | Performed by: PHYSICIAN ASSISTANT

## 2021-07-23 RX ORDER — METHYLCELLULOSE 2 G/19G
1 POWDER, FOR SOLUTION ORAL 3 TIMES DAILY
COMMUNITY
End: 2022-01-18

## 2021-07-27 NOTE — PROGRESS NOTES
Patient presents with:  Constipation: Present since hysterectomy 27 years ago. Currently present 1 month. 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 Hip Pain: L hip pain radiating to both legs over the past month.        HISTORY OF Hyperlipidemia     Hypertension     Monitoring for anticoagulant use     Diverticulosis     Spinal stenosis     Anxiety     Squamous cell carcinoma of skin of other and unspecified parts of face     History of skin cancer     History of bleeding gastric ul water intake. 2. Low back pain  Suspect related to her position and msk pain, but could be worsening with constipation. Concerning for risks for her. Recommend that she work on gentle stretches and exercises.  If still having pain could consider PT (can

## 2021-07-30 ENCOUNTER — TELEPHONE (OUTPATIENT)
Dept: FAMILY MEDICINE CLINIC | Facility: CLINIC | Age: 86
End: 2021-07-30

## 2021-08-25 ENCOUNTER — TELEPHONE (OUTPATIENT)
Dept: FAMILY MEDICINE CLINIC | Facility: CLINIC | Age: 86
End: 2021-08-25

## 2021-08-25 ENCOUNTER — OFFICE VISIT (OUTPATIENT)
Dept: FAMILY MEDICINE CLINIC | Facility: CLINIC | Age: 86
End: 2021-08-25
Payer: MEDICARE

## 2021-08-25 VITALS
TEMPERATURE: 99 F | SYSTOLIC BLOOD PRESSURE: 126 MMHG | DIASTOLIC BLOOD PRESSURE: 60 MMHG | RESPIRATION RATE: 17 BRPM | WEIGHT: 133.81 LBS | BODY MASS INDEX: 26.98 KG/M2 | OXYGEN SATURATION: 97 % | HEIGHT: 59 IN | HEART RATE: 76 BPM

## 2021-08-25 DIAGNOSIS — R26.81 GAIT INSTABILITY: ICD-10-CM

## 2021-08-25 DIAGNOSIS — E78.00 PURE HYPERCHOLESTEROLEMIA: Primary | ICD-10-CM

## 2021-08-25 DIAGNOSIS — M48.00 SPINAL STENOSIS, UNSPECIFIED SPINAL REGION: ICD-10-CM

## 2021-08-25 DIAGNOSIS — Z96.642 HISTORY OF LEFT HIP REPLACEMENT: Primary | ICD-10-CM

## 2021-08-25 PROCEDURE — 99214 OFFICE O/P EST MOD 30 MIN: CPT | Performed by: PHYSICIAN ASSISTANT

## 2021-08-26 ENCOUNTER — HOSPITAL ENCOUNTER (OUTPATIENT)
Dept: GENERAL RADIOLOGY | Facility: HOSPITAL | Age: 86
Discharge: HOME | End: 2021-08-26
Attending: PHYSICIAN ASSISTANT
Payer: MEDICARE

## 2021-08-26 DIAGNOSIS — Z96.642 HISTORY OF LEFT HIP REPLACEMENT: ICD-10-CM

## 2021-08-26 PROCEDURE — 73502 X-RAY EXAM HIP UNI 2-3 VIEWS: CPT | Performed by: PHYSICIAN ASSISTANT

## 2021-08-31 NOTE — PROGRESS NOTES
Patient presents with:  Constipation: Getting better but still ongoing  Pain: Left side, waist down to the knees, [id number]        HISTORY OF PRESENT ILLNESS  Bailey Palomino I Aimee Gonzalez is a 80year old female who presents for evaluation of left back/ hip pain known allergies.     Patient Active Problem List:     Hyperlipidemia     Hypertension     Monitoring for anticoagulant use     Diverticulosis     Spinal stenosis     Anxiety     Squamous cell carcinoma of skin of other and unspecified parts of face     Hist addt concerns or new sxs. Patient expresses understanding and agreement with above plan.   Lacie Cox PA-C

## 2021-09-07 ENCOUNTER — TELEPHONE (OUTPATIENT)
Dept: PHYSICAL THERAPY | Facility: HOSPITAL | Age: 86
End: 2021-09-07

## 2021-09-09 ENCOUNTER — TELEPHONE (OUTPATIENT)
Dept: PHYSICAL THERAPY | Facility: HOSPITAL | Age: 86
End: 2021-09-09

## 2021-09-13 ENCOUNTER — OFFICE VISIT (OUTPATIENT)
Dept: PHYSICAL THERAPY | Age: 86
End: 2021-09-13
Attending: PHYSICIAN ASSISTANT
Payer: MEDICARE

## 2021-09-13 PROCEDURE — 97110 THERAPEUTIC EXERCISES: CPT

## 2021-09-13 PROCEDURE — 97161 PT EVAL LOW COMPLEX 20 MIN: CPT

## 2021-09-14 NOTE — PROGRESS NOTES
SPINE EVALUATION:   Referring Physician: Dr. Jaison Gupta  Diagnosis:   Spinal stenosis, unspecified spinal region (M48.00)  Gait instability (R26.81)    Date of Service: 9/13/2021     PATIENT SUMMARY   Tameka Nina is a 80year old female who pre region. Denies any numbness or tingling. Pt had been having bouts of constipation and since having that addressed and resolved the pain has been noticeably better the past few weeks.      The results of the objective tests and measures show LE pain was repr Timed get up and go was 15 seconds using SPC in L hand.   80-79 y/o mean 11.3s (10.0-12.7s)    Balance: SLS R and L < 1 second each    Today’s Treatment and Response:   Pt education was provided on exam findings, treatment diagnosis, treatment plan, expecta none at this time  Rehab Potential: good    Patient/Family/Caregiver was advised of these findings, precautions, and treatment options and has agreed to actively participate in planning and for this course of care.     Thank you for your referral. Please co

## 2021-09-15 ENCOUNTER — TELEPHONE (OUTPATIENT)
Dept: FAMILY MEDICINE CLINIC | Facility: CLINIC | Age: 86
End: 2021-09-15

## 2021-09-15 ENCOUNTER — OFFICE VISIT (OUTPATIENT)
Dept: PHYSICAL THERAPY | Age: 86
End: 2021-09-15
Attending: PHYSICIAN ASSISTANT
Payer: MEDICARE

## 2021-09-15 DIAGNOSIS — Z79.01 ANTICOAGULANT LONG-TERM USE: ICD-10-CM

## 2021-09-15 DIAGNOSIS — Z71.3 DIETARY COUNSELING: Primary | ICD-10-CM

## 2021-09-15 PROCEDURE — 97110 THERAPEUTIC EXERCISES: CPT

## 2021-09-15 NOTE — TELEPHONE ENCOUNTER
Pt states that she has some constipation and is on Coumadin. Requesting some advise on diet.  Was informed that an appt will be needed

## 2021-09-15 NOTE — TELEPHONE ENCOUNTER
Called and talked to patient and she is concerned with her constipation and being on coumadin that she is limited on what she can eat to avoid affecting her coumadin but maintaining normal BM's.  She would like to see a dietician

## 2021-09-20 NOTE — PROGRESS NOTES
Diagnosis: spinal stenosis      Insurance Type (# Auth): medicare     Treatment Number: 2 of 10    Subjective: Pt has been doing her HEP as asked with  assisting. Has question on HEP.      Objective:    TE:    1 and 2 joint hip flexors -15 R   -25 L comprehensive HEP to maintain progress achieved in PT      Plan: check HEP, check progress with symptoms and HEP. Check to see if initiated pedal machine at home.     Total Timed Treatment: 40 min  Total Treatment time: 40 min  Charges: TE 3

## 2021-09-21 ENCOUNTER — TELEPHONE (OUTPATIENT)
Dept: INTERNAL MEDICINE CLINIC | Facility: CLINIC | Age: 86
End: 2021-09-21

## 2021-09-21 NOTE — TELEPHONE ENCOUNTER
Evy Phillips, [address] [facility]  [address] [phone]     Left message on answering machine to call triage. Please give pt contact information for dietician.

## 2021-09-28 ENCOUNTER — OFFICE VISIT (OUTPATIENT)
Dept: PHYSICAL THERAPY | Age: 86
End: 2021-09-28
Attending: PHYSICIAN ASSISTANT
Payer: MEDICARE

## 2021-09-28 PROCEDURE — 97110 THERAPEUTIC EXERCISES: CPT

## 2021-09-28 NOTE — PROGRESS NOTES
Diagnosis: spinal stenosis      Insurance Type (# Auth): medicare     Treatment Number: 3 of 10    Subjective: Pt reports that she has continued with regular PT HEP each day.  Has been using her home pedaling platform twice a day fr ~ a total of 30 minutes hip flexor length to WNL's to be able to stand erect while performing food preparation tasks for 10-15 minutes w/o production of B LE radiating pain.   · Pt will improve hip L hip extension strength to  > 3/5  to increase ease with standing and walking   ·

## 2021-09-29 ENCOUNTER — ORDER TRANSCRIPTION (OUTPATIENT)
Dept: ADMINISTRATIVE | Facility: HOSPITAL | Age: 86
End: 2021-09-29

## 2021-09-29 ENCOUNTER — TELEPHONE (OUTPATIENT)
Dept: PHYSICAL THERAPY | Facility: HOSPITAL | Age: 86
End: 2021-09-29

## 2021-09-29 DIAGNOSIS — K59.00 CONSTIPATION: Primary | ICD-10-CM

## 2021-09-30 ENCOUNTER — OFFICE VISIT (OUTPATIENT)
Dept: NUTRITION | Facility: HOSPITAL | Age: 86
End: 2021-09-30
Attending: FAMILY MEDICINE
Payer: MEDICARE

## 2021-09-30 ENCOUNTER — APPOINTMENT (OUTPATIENT)
Dept: PHYSICAL THERAPY | Age: 86
End: 2021-09-30
Attending: PHYSICIAN ASSISTANT
Payer: MEDICARE

## 2021-09-30 PROCEDURE — 97802 MEDICAL NUTRITION INDIV IN: CPT

## 2021-09-30 NOTE — PROGRESS NOTES
ADULT INITIAL OUTPATIENT NUTRITION CONSULTATION    Nutrition Assessment    Medical Diagnosis: constipation    PMH: noted    Client Hx: 80year old female    Meds: metamucil, colase    Labs: noted    ANTHROPOMETRICS:  Ht: 4'11\"  Wt: 133#   BMI: 27    Curr contact RD with further questions or concerns. Monitoring/Evaluation:  Pt to follow with MD. RD available prn. Time spent with patient: 60 min    Thank you for the referral,  Kathy Vargas RD, LDN  Jossy Durand. [email or web address]  P: [phone]

## 2021-10-05 ENCOUNTER — OFFICE VISIT (OUTPATIENT)
Dept: PHYSICAL THERAPY | Age: 86
End: 2021-10-05
Attending: PHYSICIAN ASSISTANT
Payer: MEDICARE

## 2021-10-05 PROCEDURE — 97110 THERAPEUTIC EXERCISES: CPT

## 2021-10-05 NOTE — PROGRESS NOTES
Diagnosis: spinal stenosis      Insurance Type (# Auth): medicare     Treatment Number: 3 of 10    Subjective: Pt reports she had minimal symptoms following the last PT session. Has consecutive days with no symptoms.   Pt reports that she has continued wit community up to 10-15 minutes w/o production of B LE radiating pain.   · Pt will have improved 1 and 2 joint hip flexor length to WNL's to be able to stand erect while performing food preparation tasks for 10-15 minutes w/o production of B LE radiating pain

## 2021-10-07 ENCOUNTER — TELEPHONE (OUTPATIENT)
Dept: FAMILY MEDICINE CLINIC | Facility: CLINIC | Age: 86
End: 2021-10-07

## 2021-10-07 NOTE — TELEPHONE ENCOUNTER
Has had constipation saw sarahi had a small BM today I suggested she try milk of magnesia to see if she has a BM if not she will call back

## 2021-10-07 NOTE — TELEPHONE ENCOUNTER
Pt having issues with Constipation. Requesting an appt tio with Dr Elizabeth White and his first appt is in Nov. Can we call and advise?

## 2021-10-13 ENCOUNTER — OFFICE VISIT (OUTPATIENT)
Dept: PHYSICAL THERAPY | Age: 86
End: 2021-10-13
Attending: PHYSICIAN ASSISTANT
Payer: MEDICARE

## 2021-10-13 PROCEDURE — 97110 THERAPEUTIC EXERCISES: CPT

## 2021-10-13 NOTE — PROGRESS NOTES
Diagnosis: spinal stenosis      Insurance Type (# Auth): medicare     Treatment Number: 5 of 10    Subjective: Pt reports she no longer has radiating pain down legs, very minimal back pain at times. Does have some L groin region ache/pain at times.  Not tre joint hip flexor length to WNL's to be able to stand erect while performing food preparation tasks for 10-15 minutes w/o production of B LE radiating pain.  Met  · Pt will improve hip L hip extension strength to  > 3/5  to increase ease with standing and wa

## 2021-10-18 ENCOUNTER — OFFICE VISIT (OUTPATIENT)
Dept: PHYSICAL THERAPY | Age: 86
End: 2021-10-18
Attending: PHYSICIAN ASSISTANT
Payer: MEDICARE

## 2021-10-18 PROCEDURE — 97110 THERAPEUTIC EXERCISES: CPT

## 2021-10-18 NOTE — PROGRESS NOTES
Diagnosis: spinal stenosis      Insurance Type (# Auth): medicare     Treatment Number: 6 of 10    Subjective: Pt has questions on HEP. Has had some L hip pain return the past few days.      Objective:    TE:  Gait: flexed trunk, moderate shift of his,spine will have improved lumbar spine AROM to be able to be able to ambulate at home and for walks in the community up to 10-15 minutes w/o production of B LE radiating pain.   · Pt will have improved 1 and 2 joint hip flexor length to WNL's to be able to stand e

## 2021-10-20 ENCOUNTER — APPOINTMENT (OUTPATIENT)
Dept: CT IMAGING | Facility: HOSPITAL | Age: 86
End: 2021-10-20
Attending: EMERGENCY MEDICINE
Payer: MEDICARE

## 2021-10-20 ENCOUNTER — HOSPITAL ENCOUNTER (EMERGENCY)
Facility: HOSPITAL | Age: 86
Discharge: HOME | End: 2021-10-20
Attending: EMERGENCY MEDICINE
Payer: MEDICARE

## 2021-10-20 VITALS
RESPIRATION RATE: 14 BRPM | SYSTOLIC BLOOD PRESSURE: 154 MMHG | WEIGHT: 130 LBS | TEMPERATURE: 98 F | OXYGEN SATURATION: 97 % | DIASTOLIC BLOOD PRESSURE: 72 MMHG | HEART RATE: 71 BPM | HEIGHT: 61 IN | BODY MASS INDEX: 24.55 KG/M2

## 2021-10-20 DIAGNOSIS — R10.32 LLQ PAIN: Primary | ICD-10-CM

## 2021-10-20 DIAGNOSIS — K59.00 CONSTIPATION, UNSPECIFIED CONSTIPATION TYPE: ICD-10-CM

## 2021-10-20 PROCEDURE — 80053 COMPREHEN METABOLIC PANEL: CPT | Performed by: EMERGENCY MEDICINE

## 2021-10-20 PROCEDURE — 74177 CT ABD & PELVIS W/CONTRAST: CPT | Performed by: EMERGENCY MEDICINE

## 2021-10-20 PROCEDURE — 99284 EMERGENCY DEPT VISIT MOD MDM: CPT

## 2021-10-20 PROCEDURE — 36415 COLL VENOUS BLD VENIPUNCTURE: CPT

## 2021-10-20 PROCEDURE — 85025 COMPLETE CBC W/AUTO DIFF WBC: CPT | Performed by: EMERGENCY MEDICINE

## 2021-10-20 PROCEDURE — 81001 URINALYSIS AUTO W/SCOPE: CPT | Performed by: EMERGENCY MEDICINE

## 2021-10-20 NOTE — ED INITIAL ASSESSMENT (HPI)
Pt to ED with c/o intermittent left groin pain. Pt states this has been going on since the 4th of July. She states she was told it was due to constipation in the past, but she has been having normal regular BM at this time.

## 2021-10-21 ENCOUNTER — APPOINTMENT (OUTPATIENT)
Dept: PHYSICAL THERAPY | Age: 86
End: 2021-10-21
Attending: PHYSICIAN ASSISTANT
Payer: MEDICARE

## 2021-10-21 NOTE — ED PROVIDER NOTES
Patient Seen in: [facility] Emergency Department      History   Patient presents with:  Pain    Stated Complaint: pain on left side of groin due to \"constipation\" she believes - ongoing since j*    Subjective:   HPI    Intermittent left groin pain \"constipation\" she believes - ongoing since j*  Other systems are as noted in HPI. Constitutional and vital signs reviewed. All other systems reviewed and negative except as noted above.     Physical Exam     ED Triage Vitals [10/20/21 1110]   BP 15 DIFFERENTIAL WITH PLATELET    Narrative: The following orders were created for panel order CBC With Differential With Platelet.   Procedure                               Abnormality         Status                     --------- PANCREAS:  No lesion, fluid collection, ductal dilatation, or atrophy. SPLEEN:  No enlargement or focal lesion.   KIDNEYS:  Bilateral renal cortical low-density lesions, the largest measures 1.9 cm within the mid to inferior left kidney consistent with a c exam.  She has good range of motion of her left hip, ambulates normally does not change the pain. CT scan obtained for diverticulitis, appendicitis, potentially obstructing kidney stone. No acute abnormalities found.   Pain may be related to constipat

## 2021-10-26 ENCOUNTER — OFFICE VISIT (OUTPATIENT)
Dept: PHYSICAL THERAPY | Age: 86
End: 2021-10-26
Attending: PHYSICIAN ASSISTANT
Payer: MEDICARE

## 2021-10-26 PROCEDURE — 97150 GROUP THERAPEUTIC PROCEDURES: CPT

## 2021-10-26 PROCEDURE — 97110 THERAPEUTIC EXERCISES: CPT

## 2021-10-26 NOTE — PROGRESS NOTES
Diagnosis: spinal stenosis      Insurance Type (# Auth): medicare     Treatment Number: 7 of 10  Subjective: Pt states that her L hip has been absent for the past 4-5 days. She went to [address] ED for that complaint last week. Had extensive testing of L lower able to stand erect while performing food preparation tasks for 10-15 minutes w/o production of B LE radiating pain.  Met  · Pt will improve hip L hip extension strength to  > 3/5  to increase ease with standing and walking   · Pt will perform TUG in < 13 s

## 2021-10-28 ENCOUNTER — TELEPHONE (OUTPATIENT)
Dept: PHYSICAL THERAPY | Facility: HOSPITAL | Age: 86
End: 2021-10-28

## 2021-10-28 ENCOUNTER — APPOINTMENT (OUTPATIENT)
Dept: PHYSICAL THERAPY | Age: 86
End: 2021-10-28
Attending: PHYSICIAN ASSISTANT
Payer: MEDICARE

## 2021-11-02 ENCOUNTER — APPOINTMENT (OUTPATIENT)
Dept: PHYSICAL THERAPY | Age: 86
End: 2021-11-02
Attending: PHYSICIAN ASSISTANT
Payer: MEDICARE

## 2021-11-04 ENCOUNTER — APPOINTMENT (OUTPATIENT)
Dept: PHYSICAL THERAPY | Age: 86
End: 2021-11-04
Attending: PHYSICIAN ASSISTANT
Payer: MEDICARE

## 2021-11-08 ENCOUNTER — APPOINTMENT (OUTPATIENT)
Dept: PHYSICAL THERAPY | Age: 86
End: 2021-11-08
Attending: PHYSICIAN ASSISTANT
Payer: MEDICARE

## 2022-01-11 ENCOUNTER — PATIENT MESSAGE (OUTPATIENT)
Dept: FAMILY MEDICINE CLINIC | Facility: CLINIC | Age: 87
End: 2022-01-11

## 2022-01-11 NOTE — TELEPHONE ENCOUNTER
From: Jackie Junior  To: Wander Castro MD  Sent: 1/11/2022 8:04 AM CST  Subject: Annual physical    Good morning I have my annual physical next Tuesday.  I would like to bring my daughter with so she can help me understand everything and help

## 2022-01-15 ENCOUNTER — TELEPHONE (OUTPATIENT)
Dept: FAMILY MEDICINE CLINIC | Facility: CLINIC | Age: 87
End: 2022-01-15

## 2022-01-18 ENCOUNTER — OFFICE VISIT (OUTPATIENT)
Dept: FAMILY MEDICINE CLINIC | Facility: CLINIC | Age: 87
End: 2022-01-18
Payer: MEDICARE

## 2022-01-18 DIAGNOSIS — F02.80 ALZHEIMER'S DEMENTIA WITHOUT BEHAVIORAL DISTURBANCE, UNSPECIFIED TIMING OF DEMENTIA ONSET (HCC): ICD-10-CM

## 2022-01-18 DIAGNOSIS — Z00.00 ENCOUNTER FOR ANNUAL HEALTH EXAMINATION: Primary | ICD-10-CM

## 2022-01-18 DIAGNOSIS — E78.00 PURE HYPERCHOLESTEROLEMIA: ICD-10-CM

## 2022-01-18 DIAGNOSIS — Z96.642 HISTORY OF LEFT HIP REPLACEMENT: ICD-10-CM

## 2022-01-18 DIAGNOSIS — R10.32 LEFT GROIN PAIN: ICD-10-CM

## 2022-01-18 DIAGNOSIS — K59.00 CONSTIPATION, UNSPECIFIED CONSTIPATION TYPE: ICD-10-CM

## 2022-01-18 DIAGNOSIS — F32.5 MAJOR DEPRESSION IN REMISSION (HCC): ICD-10-CM

## 2022-01-18 DIAGNOSIS — Z13.31 DEPRESSION SCREENING: ICD-10-CM

## 2022-01-18 DIAGNOSIS — G30.9 ALZHEIMER'S DEMENTIA WITHOUT BEHAVIORAL DISTURBANCE, UNSPECIFIED TIMING OF DEMENTIA ONSET (HCC): ICD-10-CM

## 2022-01-18 DIAGNOSIS — I10 PRIMARY HYPERTENSION: ICD-10-CM

## 2022-01-18 PROCEDURE — G0444 DEPRESSION SCREEN ANNUAL: HCPCS | Performed by: FAMILY MEDICINE

## 2022-01-18 PROCEDURE — G0439 PPPS, SUBSEQ VISIT: HCPCS | Performed by: FAMILY MEDICINE

## 2022-01-18 PROCEDURE — 99214 OFFICE O/P EST MOD 30 MIN: CPT | Performed by: FAMILY MEDICINE

## 2022-01-18 RX ORDER — ESCITALOPRAM OXALATE 10 MG/1
15 TABLET ORAL DAILY
COMMUNITY

## 2022-01-18 NOTE — PROGRESS NOTES
Subjective:   Diamond Meadows is a 80year old female who presents for a Medicare Subsequent Annual Wellness visit (Pt already had Initial Annual Wellness). Preventative  Breast: not interested.     Colon: no indication  Pap: n/a  Immunizations on screening of functional status.        Depression Screening (PHQ-2/PHQ-9): PHQ-9 Depression Screen  PHQ-9 TOTAL SCORE: 3, done 1/18/2022   Trouble falling or staying asleep, or sleeping too much: Several days   Feeling tired or having little energy: Eric Walsh take one tablet 1-day weekly or as directed by Anticoagulation Clinic  amoxicillin 500 MG Oral Tab, Four Tablets one hour before procedure. Acetaminophen 500 MG Oral Cap, Take 1,000 mg by mouth one time. Psyllium (METAMUCIL OR), Take by mouth.  Two scoops negative  Ears, nose, mouth, throat, and face: negative  Respiratory: negative  Cardiovascular: negative  Gastrointestinal: contsiptation  Genitourinary: negative  Neurological: memory issues.     Objective:   Physical Exam  General Appearance:  Alert, coop Sam Kumar was seen in the office today:  had concerns including Physical (MAAVILA). 1. Encounter for annual health examination  Overall fair.   Concerned about memory and the blood pressure at this time  At 80years old, she is declining addendum:  BP reports from patient at end of week  1/21/22 119/72 and 122/68   1/20/21 120/71  All normal.  Reassured. Will continue with meds where they are. The patient indicates understanding of these issues and agrees to the plan.   Reinforced he (HDL)  Triglycerides Covered every 5 years for all Medicare beneficiaries without apparent signs or symptoms of cardiovascular disease Lab Results   Component Value Date    CHOLEST 161.00 02/04/2021    HDL 57 02/04/2021    LDL 82 02/04/2021    LDLD 76 12/1 11/01/2000     No recommendations at this time    Hepatitis B One screening covered for patients with certain risk factors   -  No recommendations at this time    Tetanus Toxoid Not covered by Medicare Part B unless medically necessary (cut with metal); ma

## 2022-01-18 NOTE — PATIENT INSTRUCTIONS
Ryan Callahan's SCREENING SCHEDULE   Tests on this list are recommended by your physician but may not be covered, or covered at this frequency, by your insurer. Please check with your insurance carrier before scheduling to verify coverage. any previous visit. No recommendations at this time   Pap and Pelvic    Pap   Covered every 2 years for women at normal risk;  Annually if at high risk -  No recommendations at this time    Chlamydia Annually if high risk -  No recommendations at this

## 2022-01-21 ENCOUNTER — TELEPHONE (OUTPATIENT)
Dept: FAMILY MEDICINE CLINIC | Facility: CLINIC | Age: 87
End: 2022-01-21

## 2022-01-21 VITALS
HEART RATE: 70 BPM | HEIGHT: 60 IN | TEMPERATURE: 98 F | WEIGHT: 131.19 LBS | SYSTOLIC BLOOD PRESSURE: 120 MMHG | RESPIRATION RATE: 16 BRPM | OXYGEN SATURATION: 98 % | DIASTOLIC BLOOD PRESSURE: 71 MMHG | BODY MASS INDEX: 25.76 KG/M2

## 2022-01-21 NOTE — TELEPHONE ENCOUNTER
BP readings     1/21/22 119/72 and 122/68   1/20/21 120/71    Patient was told by Dr. Gerard Harrell to call in and give the last two days of readings.

## 2022-03-03 ENCOUNTER — OFFICE VISIT (OUTPATIENT)
Dept: NEUROLOGY | Facility: CLINIC | Age: 87
End: 2022-03-03
Payer: MEDICARE

## 2022-03-03 ENCOUNTER — LAB ENCOUNTER (OUTPATIENT)
Dept: LAB | Age: 87
End: 2022-03-03
Attending: Other
Payer: MEDICARE

## 2022-03-03 VITALS
DIASTOLIC BLOOD PRESSURE: 76 MMHG | HEIGHT: 60 IN | BODY MASS INDEX: 25.52 KG/M2 | HEART RATE: 74 BPM | RESPIRATION RATE: 16 BRPM | WEIGHT: 130 LBS | SYSTOLIC BLOOD PRESSURE: 110 MMHG

## 2022-03-03 DIAGNOSIS — R41.3 SHORT-TERM MEMORY LOSS: Primary | ICD-10-CM

## 2022-03-03 DIAGNOSIS — R63.39 OTHER FEEDING DIFFICULTIES: ICD-10-CM

## 2022-03-03 DIAGNOSIS — R41.3 SHORT-TERM MEMORY LOSS: ICD-10-CM

## 2022-03-03 LAB
TSI SER-ACNC: 1.52 MIU/ML (ref 0.36–3.74)
VIT B12 SERPL-MCNC: 407 PG/ML (ref 193–986)

## 2022-03-03 PROCEDURE — 84443 ASSAY THYROID STIM HORMONE: CPT

## 2022-03-03 PROCEDURE — 82607 VITAMIN B-12: CPT

## 2022-03-03 PROCEDURE — 84630 ASSAY OF ZINC: CPT

## 2022-03-03 PROCEDURE — 99204 OFFICE O/P NEW MOD 45 MIN: CPT | Performed by: OTHER

## 2022-03-03 PROCEDURE — 82525 ASSAY OF COPPER: CPT

## 2022-03-03 NOTE — PROGRESS NOTES
New Patient for Memory Loss- Patient present today with daughter. Patient states she has been experiencing memory issues for the past year. Patient states she is unable to recall the names of 2 of her grandchildren. Patient states she has occasional trouble word finding.

## 2022-03-06 LAB — ZINC SERUM: 56 UG/DL

## 2022-03-09 ENCOUNTER — LAB ENCOUNTER (OUTPATIENT)
Dept: LAB | Age: 87
End: 2022-03-09
Attending: FAMILY MEDICINE
Payer: MEDICARE

## 2022-03-09 DIAGNOSIS — E78.00 PURE HYPERCHOLESTEROLEMIA: ICD-10-CM

## 2022-03-09 LAB
ALBUMIN SERPL-MCNC: 3.8 G/DL (ref 3.4–5)
ALBUMIN/GLOB SERPL: 1.2 {RATIO} (ref 1–2)
ALP LIVER SERPL-CCNC: 129 U/L
ALT SERPL-CCNC: 25 U/L
ANION GAP SERPL CALC-SCNC: 3 MMOL/L (ref 0–18)
AST SERPL-CCNC: 24 U/L (ref 15–37)
BILIRUB SERPL-MCNC: 0.5 MG/DL (ref 0.1–2)
BUN BLD-MCNC: 17 MG/DL (ref 7–18)
CALCIUM BLD-MCNC: 9.9 MG/DL (ref 8.5–10.1)
CHLORIDE SERPL-SCNC: 108 MMOL/L (ref 98–112)
CHOLEST SERPL-MCNC: 176 MG/DL (ref ?–200)
CO2 SERPL-SCNC: 28 MMOL/L (ref 21–32)
CREAT BLD-MCNC: 1.03 MG/DL
FASTING PATIENT LIPID ANSWER: YES
FASTING STATUS PATIENT QL REPORTED: YES
GLOBULIN PLAS-MCNC: 3.3 G/DL (ref 2.8–4.4)
GLUCOSE BLD-MCNC: 95 MG/DL (ref 70–99)
HDLC SERPL-MCNC: 57 MG/DL (ref 40–59)
LDLC SERPL CALC-MCNC: 99 MG/DL (ref ?–100)
NONHDLC SERPL-MCNC: 119 MG/DL (ref ?–130)
OSMOLALITY SERPL CALC.SUM OF ELEC: 289 MOSM/KG (ref 275–295)
POTASSIUM SERPL-SCNC: 4.7 MMOL/L (ref 3.5–5.1)
PROT SERPL-MCNC: 7.1 G/DL (ref 6.4–8.2)
SODIUM SERPL-SCNC: 139 MMOL/L (ref 136–145)
TRIGL SERPL-MCNC: 110 MG/DL (ref 30–149)
VLDLC SERPL CALC-MCNC: 18 MG/DL (ref 0–30)

## 2022-03-09 PROCEDURE — 80053 COMPREHEN METABOLIC PANEL: CPT

## 2022-03-09 PROCEDURE — 80061 LIPID PANEL: CPT

## 2022-03-16 ENCOUNTER — TELEPHONE (OUTPATIENT)
Dept: NEUROLOGY | Facility: CLINIC | Age: 87
End: 2022-03-16

## 2022-03-16 NOTE — TELEPHONE ENCOUNTER
Copper, one of the lab tests, was canceled for some reason. Will reorder it, slightly differently, and she should get this drawn.

## 2022-03-17 ENCOUNTER — LAB ENCOUNTER (OUTPATIENT)
Dept: LAB | Facility: HOSPITAL | Age: 87
End: 2022-03-17
Attending: NURSE ANESTHETIST, CERTIFIED REGISTERED
Payer: MEDICARE

## 2022-03-17 DIAGNOSIS — R41.3 SHORT-TERM MEMORY LOSS: ICD-10-CM

## 2022-03-17 DIAGNOSIS — R63.39 OTHER FEEDING DIFFICULTIES: ICD-10-CM

## 2022-03-17 DIAGNOSIS — R63.30 FEEDING DIFFICULTIES, UNSPECIFIED: ICD-10-CM

## 2022-03-17 PROCEDURE — 82525 ASSAY OF COPPER: CPT

## 2022-03-17 PROCEDURE — 36415 COLL VENOUS BLD VENIPUNCTURE: CPT

## 2022-03-22 LAB — COPPER, SERUM: 119.7 UG/DL

## 2022-03-24 PROBLEM — H90.3 SENSORINEURAL HEARING LOSS (SNHL) OF BOTH EARS: Status: ACTIVE | Noted: 2022-03-24

## 2022-03-26 LAB — COPPER, RBC: 60.3 MCG/DL

## 2022-04-07 NOTE — IP AVS SNAPSHOT
Incoming call from patient. Patient reports feeling hot and sweaty with blurry vision at work, which led to a vasovagal episode. Patient states her nausea and vomiting has improved. Denies any bleeding, cramping, or leakage of fluid. Instructed patient to increase her fluid intake to 80-100oz/day, consume small, frequent meals throughout the day, wear compression stockings, and follow up with PCP if another vasovagal episode occurs per Dr. Gill. Patient in agreement with plan.   Patient Demographics     Address  [id number]  [place] [address] Phone  [phone] [facility]  [phone] [facility]      Emergency Contact(s)     Name Relation Home Work [address], pain.  Use as needed per instruction. Colace Helps to prevent constipation. Please follow up with your PCP in one week from date of surgery to address any medical issues or to reconcile any change in home medications from hospital admission.         H FOR MEDIPORE/COVERLET DRESSINGS:  Change dressing daily using Medipore/coverlet once Aquacel (waterproof) dressing is removed (which is about 7 days after surgery). Patient should be standing or lying flat so dressing goes on smoothly.   (This dressing need weeks and then begin to decrease how often you are taking it. ? Take pain medication as prescribed with food, especially before therapy, allowing 30-60 minutes to take effect. ? Do not drink alcohol while on pain medication.   ? As you have less discomfor any dental or other invasive surgical procedures after your joint replacement. Speak with your physician about this at your post-op office visit. ? Eat a balanced diet high in fiber and drink plenty of fluids. ?  Continue using incentive spirometry nany Go directly to the ER or CALL 911 if  you:  ? become short of breath  ? have chest pain  ? cough up blood  ? have unexplained anxiety with breathing       Traveling and Handicapped parking  ?  Check with you surgeon when you are allowed to travel so you don visit to surgeon’s office (about 2-3 weeks) and then check with surgeon if need to continue use. ? Take off to shower. Best to keep on as much as possible, even at night, except when washing out. ?  Wash with mild soap and water; DRIP dry overnight- put b Commonly known as:  LEXAPRO  Next dose due:  TOMORROW AM      Take 10 mg by mouth daily. HYDROcodone-acetaminophen 5-325 MG Tabs  Commonly known as:  NORCO      Take 1-2 tablets by mouth every 6 (six) hours as needed for Pain. For severe pain.    A [place] [address]   [facility], [phone], [address] [place] [address] [facility] [address]    Phone:  [phone]   docusate sodium 100 MG Caps  Enoxaparin Sodium 60 MG/0.6ML Soln  Ondansetron HCl 4 mg tablet     P 10/25/19 0840 Given            RIGHT LOWER ABDOMEN     Order ID Medication Name Action Time Action Reason Comments    [id number] Enoxaparin Sodium (LOVENOX) 60 MG/0.6ML injection 60 mg 10/24/19 2133 Given              Recent Vital Signs       Most Recent Va CBC, PLATELET; NO DIFFERENTIAL [[id number]] (Abnormal)  Resulted: 10/25/19 0539, Result status: Final result   Ordering provider:  Ester Cross MD  10/24/19 3442 Resulting lab:  [facility]    Specimen Information    Type Source Collected On   Blood — 10/25/ Kiko Sanchez is a 80year old female who is being evaluated for pre-surgical clearance at the request of Dr. Vijay Stone. Surgery: L hip replacement  Surgeon:  Zuly BECERRA  Date of Surgery: 10/23/19  Previous Anesthesia: typically no issues in th Past Surgical History:   Procedure Laterality Date   • Cataract Bilateral 10/11/2012   • Other         Right knee arthroscopy   • Tonsillectomy   1953   • Total abdominal hysterectomy   1994     VINCENT/BSO Fibroids               Family History   Problem Relat Skin: Skin color, texture, turgor normal, no rashes or lesions   Neurologic: Normal         ASSESSMENT AND PLAN:      Brittny Goodwin was seen in the office today:  had concerns including Pre-Op Exam (LEFT HIP REPLACEMENT 10/23).     1.  Preop exam Electronically signed by Marian Eldridge MD on 10/23/2019 12:57 PM   Attribution Key    AK. 1 - Marian Eldridge MD on 10/23/2019 12:56 PM                        Consults - MD Consult Notes      Consults signed by Allen Maxwell MD at 10/24/2019  3:54 AM in the range of 75/46, responded well to IV fluids[MJ.3]    History:[MJ.1]  Past Medical History:   Diagnosis Date   • Anxiety    • Diverticulosis    • Esophageal reflux    • Essential hypertension    • Hearing impairment     Bilateral hearing aides   • Hi 0.4 mg, 0.4 mg, Intravenous, Q2H PRN **OR** HYDROmorphone HCl (DILAUDID) 1 MG/ML injection 0.8 mg, 0.8 mg, Intravenous, Q2H PRN  •  sodium chloride 0.9% IV bolus 500 mL, 500 mL, Intravenous, Once PRN  •  0.9% NaCl infusion, , Intravenous, Continuous  •  te Respiratory: Clear to auscultation bilaterally. No wheezes. No rhonchi. Cardiovascular: S1, S2.  Regular rate and rhythm. No murmurs. Equal pulses   Abdomen: Soft, nontender, nondistended. Positive bowel sounds.  No rebound tenderness  Neurologic:[MJ.1] Electronically signed by Sagar Nelson MD on 10/24/2019  3:54 AM   Attribution Venegas    MJ.1 - Sagar Nelson MD on 10/23/2019  6:48 PM  MJ.2 - Sagar Nelson MD on 10/23/2019  6:49 PM  MJ.3 - Sagar Nelson MD on 10/24/2019  3:42 AM                     D/C • CATARACT Bilateral 10/11/2012   • HIP TOTAL ANTERIOR APPROACH Left 10/23/2019    Performed by Berry Petersen MD at [facility] MAIN OR   • OTHER      Right knee arthroscopy   • TONSILLECTOMY  1953   • TOTAL ABDOMINAL HYSTERECTOMY  1994    VINCENT/BSO Fibroids       SUB Skilled Therapy Provided: AM: pt was wheeled to rehab gym and participated in seated and standing therex per ELGIN protocol. Pt required cues for proper body mechanics, technique and sequencing.  Pt gait trained 100 ' c RW and CGA  c cues for WBAT, proper int from home c HHPT. Pt's daughter reports she will be staying with pt at d/c .        DISCHARGE RECOMMENDATIONS  PT Discharge Recommendations: Home with home health PT    PLAN  PT Treatment Plan: Bed mobility; Endurance; Energy conservation;Patient education;G Esophageal reflux    Patent foramen ovale    Primary osteoarthritis of left hip      Past Medical History   Past Medical History:   Diagnosis Date   • Anxiety    • Diverticulosis    • Esophageal reflux    • Essential hypertension    • Hearing impairment wheelchair, bedside commode, etc.): None   -   Moving from lying on back to sitting on the side of the bed?: A Little   How much help from another person does the patient currently need. ..   -   Moving to and from a bed to a chair (including a wheelchair)? Patient End of Session: Up in chair;Needs met;Call light within reach;RN aware of session/findings; All patient questions and concerns addressed; Family present    ASSESSMENT   Pt continues to present with impaired strength , endurance and balance below PLOF Filed:  10/24/2019  9:47 AM Date of Service:  10/24/2019  9:41 AM Status:  Signed    :   Anum Cain PT (Physical Therapist)       PHYSICAL THERAPY HIP EVALUATION - INPATIENT     Room Number: 373/373-A  Evaluation Date: 10/24/2019  Type of Ev \"I'd like to go home tonight. \"     Patient self-stated goal is to return home.      OBJECTIVE  Precautions: Hip abduction pillow;ELGIN - anterior  Fall Risk: High fall risk    WEIGHT BEARING RESTRICTION  Weight Bearing Restriction: L lower extremity Pattern: L Decreased stance time(step to gait pattern)          Skilled Therapy Provided: Pt received sitting up in bedside chair and is agreeable to therapy. Pt educated on anterior hip precautions. Pt educated on transfer set up with use of RW.  Pt sit-st with HHPT in order to further address above mentioned impairments and functional mobility deficits in order to return to independent prior level of function.    DISCHARGE RECOMMENDATIONS  PT Discharge Recommendations: Home with home health PT    PLAN  PT Tr 10/23:    PRE-OP DX:  LEFT HIP PRIMARY OSTEOARTHRITIS  POST-OP DX:  LEFT HIP PRIMARY OSTEOARTHRITIS  PROCEDURE:  DIRECT ANTERIOR LEFT TOTAL HIP REPLACEMENT        Therapy significant co-morbidities:   dep/anx, CVA       Problem List  Active Problems: AM-PAC ‘6-Clicks’ Inpatient Daily Activity Short Form  How much help from another person does the patient currently need…  -   Putting on and taking off regular lower body clothing?: A Little  -   Bathing (including washing, rinsing, drying)?: A Little  - Patient End of Session: Up in chair;Needs met;Call light within reach; All patient questions and concerns addressed; Ice applied;SCDs in place    ASSESSMENT   Patient is a 80year old female admitted on 10/23/2019 from home. Pt is now s/p L ELGIN on 10/23.  Com Patient will transfer from supine to sit:  with supervision[LD. 1] goal met[LD.2] 10/25/2019[LD.3]  Patient will transfer from sit to stand:  with supervision[LD. 1] goal met[LD.2] 10/25/2019[LD.3]  Patient will transfer to toilet:  with supervision[LD. 1] go • Diverticulosis    • Esophageal reflux    • Essential hypertension    • Hearing impairment     Bilateral hearing aides   • High blood pressure    • High cholesterol    • Hyperlipidemia    • Hypertension    • Long term (current) use of anticoagulants    • Overall Cognitive Status:  WFL - within functional limits[LD.3]    VISION[LD.1]  Current Vision: wears glasses all the time  Low vision, does not drive due to low vision[LD. 3]         RANGE OF MOTION AND STRENGTH ASSESSMENT  Upper extremity ROM is within f an elevated height with a commode placed over a standard toilet. Pt performed all aspects of toileting including pipe-care and clothing management with[LD.1] min[LD.3] assist. Pt performed functional mobility with RW and[LD. 1] CG[LD.3] assist to chair with problem-focused assessments, limited treatment options[LD.3]    Overall Complexity[LD.1] LOW[LD.3]     OT Discharge Recommendations: Home with home health PT/OT; Intermittent Supervision  OT Device Recommendations: TBD[LD.2]    PLAN[LD.1]  OT Treatment Plan Future Appointments        Provider Preet Johnson    10/28/2019 2:00 PM [facility] [address]    11/6/2019 10:30 AM Papi Cain MD [address]    3/6/2020 11:30 AM Taco Drake,

## 2022-04-14 ENCOUNTER — TELEPHONE (OUTPATIENT)
Dept: FAMILY MEDICINE CLINIC | Facility: CLINIC | Age: 87
End: 2022-04-14

## 2022-04-14 RX ORDER — OMEPRAZOLE 20 MG/1
20 CAPSULE, DELAYED RELEASE ORAL
Qty: 90 CAPSULE | Refills: 3 | Status: SHIPPED | OUTPATIENT
Start: 2022-04-14

## 2022-04-14 RX ORDER — OMEPRAZOLE 20 MG/1
20 CAPSULE, DELAYED RELEASE ORAL EVERY MORNING
Qty: 30 CAPSULE | Refills: 0 | Status: SHIPPED | OUTPATIENT
Start: 2022-04-14 | End: 2022-04-18

## 2022-04-14 NOTE — TELEPHONE ENCOUNTER
Notified Leatha Duque that her medications were refilled both locally and to mail order.  She verbalized understanding

## 2022-04-14 NOTE — TELEPHONE ENCOUNTER
Pt is calling she said she is out of her Omeprazole 20 mg oral capsules and needs it, her mail order [facility] but they said they will not send right away can we send a small order to [facility] [id number]. please call when sent.

## 2022-04-15 RX ORDER — OMEPRAZOLE 20 MG/1
CAPSULE, DELAYED RELEASE ORAL
Qty: 30 CAPSULE | Refills: 0 | Status: CANCELLED | OUTPATIENT
Start: 2022-04-15

## 2022-04-16 RX ORDER — OMEPRAZOLE 20 MG/1
CAPSULE, DELAYED RELEASE ORAL
Qty: 30 CAPSULE | Refills: 0 | OUTPATIENT
Start: 2022-04-16

## 2022-04-18 ENCOUNTER — TELEPHONE (OUTPATIENT)
Dept: FAMILY MEDICINE CLINIC | Facility: CLINIC | Age: 87
End: 2022-04-18

## 2022-04-18 NOTE — TELEPHONE ENCOUNTER
Patient keeps calling but unable to hear her on the line we are thinking she is calling on her medication status

## 2022-04-18 NOTE — TELEPHONE ENCOUNTER
Patient went to [place] but couldn't get her prescription today due to computer issues. When she got home she has seen that her mail order had arrive. Local refill not needed anymore. Cancelled now. Patient verbalized understanding of information given.

## 2022-04-18 NOTE — TELEPHONE ENCOUNTER
Pt is calling [facility] is telling her that they never received the prescription for Omeprazole 20 mg she has been out of it for over a week.  Please call [facility].

## 2022-04-19 RX ORDER — OMEPRAZOLE 20 MG/1
CAPSULE, DELAYED RELEASE ORAL
Qty: 30 CAPSULE | Refills: 0 | OUTPATIENT
Start: 2022-04-19

## 2022-05-22 ENCOUNTER — PATIENT MESSAGE (OUTPATIENT)
Dept: NEUROLOGY | Facility: CLINIC | Age: 87
End: 2022-05-22

## 2022-05-24 ENCOUNTER — TELEPHONE (OUTPATIENT)
Dept: NEUROLOGY | Facility: CLINIC | Age: 87
End: 2022-05-24

## 2022-05-24 DIAGNOSIS — R41.3 SHORT-TERM MEMORY LOSS: Primary | ICD-10-CM

## 2022-05-24 RX ORDER — DONEPEZIL HYDROCHLORIDE 10 MG/1
TABLET, FILM COATED ORAL
Qty: 90 TABLET | Refills: 0 | Status: SHIPPED | OUTPATIENT
Start: 2022-05-24 | End: 2022-05-24

## 2022-05-24 RX ORDER — DONEPEZIL HYDROCHLORIDE 10 MG/1
TABLET, FILM COATED ORAL
Qty: 90 TABLET | Refills: 0 | Status: SHIPPED | OUTPATIENT
Start: 2022-05-24

## 2022-05-24 NOTE — TELEPHONE ENCOUNTER
pt's daughter req Donepezil prescribed today sent to [facility], [address] [place] [address] [address] [facility] [phone], [phone], instead of mail order

## 2022-07-22 ENCOUNTER — PATIENT MESSAGE (OUTPATIENT)
Dept: FAMILY MEDICINE CLINIC | Facility: CLINIC | Age: 87
End: 2022-07-22

## 2022-08-16 ENCOUNTER — TELEPHONE (OUTPATIENT)
Dept: FAMILY MEDICINE CLINIC | Facility: CLINIC | Age: 87
End: 2022-08-16

## 2022-08-16 DIAGNOSIS — R41.3 SHORT-TERM MEMORY LOSS: ICD-10-CM

## 2022-08-16 DIAGNOSIS — E78.00 PURE HYPERCHOLESTEROLEMIA: Primary | ICD-10-CM

## 2022-08-16 NOTE — TELEPHONE ENCOUNTER
Please enter lab orders for the patient's upcoming physical appointment. Physical scheduled: Your appointments     Date & Time Appointment Department [facility])    Jan 24, 2023  2:30 PM CST Medicare Annual Well Visit with Lady Lon MD [facility], [address], [place]  ([address])            Gaetano Stewart [phone]         Preferred lab: [facility])     The patient has been notified to complete fasting labs prior to their physical appointment.

## 2022-08-18 RX ORDER — DONEPEZIL HYDROCHLORIDE 10 MG/1
TABLET, FILM COATED ORAL
Qty: 90 TABLET | Refills: 1 | Status: SHIPPED | OUTPATIENT
Start: 2022-08-18

## 2022-08-25 ENCOUNTER — OFFICE VISIT (OUTPATIENT)
Dept: NEUROLOGY | Facility: CLINIC | Age: 87
End: 2022-08-25
Payer: MEDICARE

## 2022-08-25 VITALS
HEART RATE: 56 BPM | BODY MASS INDEX: 25 KG/M2 | DIASTOLIC BLOOD PRESSURE: 60 MMHG | SYSTOLIC BLOOD PRESSURE: 120 MMHG | RESPIRATION RATE: 16 BRPM | WEIGHT: 128 LBS

## 2022-08-25 DIAGNOSIS — R41.3 SHORT-TERM MEMORY LOSS: Primary | ICD-10-CM

## 2022-08-25 PROCEDURE — 99213 OFFICE O/P EST LOW 20 MIN: CPT | Performed by: OTHER

## 2022-08-25 NOTE — PROGRESS NOTES
Patient states some changes in sleep patterns since starting the donepezil. Patient states memory has improved.

## 2022-08-31 ENCOUNTER — OFFICE VISIT (OUTPATIENT)
Dept: FAMILY MEDICINE CLINIC | Facility: CLINIC | Age: 87
End: 2022-08-31
Payer: MEDICARE

## 2022-08-31 VITALS
HEIGHT: 60.25 IN | WEIGHT: 128.81 LBS | RESPIRATION RATE: 16 BRPM | HEART RATE: 60 BPM | BODY MASS INDEX: 24.96 KG/M2 | SYSTOLIC BLOOD PRESSURE: 138 MMHG | TEMPERATURE: 99 F | DIASTOLIC BLOOD PRESSURE: 60 MMHG

## 2022-08-31 DIAGNOSIS — R32 URINARY INCONTINENCE, UNSPECIFIED TYPE: ICD-10-CM

## 2022-08-31 DIAGNOSIS — K21.9 GASTROESOPHAGEAL REFLUX DISEASE, UNSPECIFIED WHETHER ESOPHAGITIS PRESENT: Chronic | ICD-10-CM

## 2022-08-31 DIAGNOSIS — K59.00 CONSTIPATION, UNSPECIFIED CONSTIPATION TYPE: Primary | ICD-10-CM

## 2022-08-31 DIAGNOSIS — I10 PRIMARY HYPERTENSION: ICD-10-CM

## 2022-08-31 PROCEDURE — 99213 OFFICE O/P EST LOW 20 MIN: CPT | Performed by: FAMILY MEDICINE

## 2022-08-31 RX ORDER — OMEPRAZOLE 20 MG/1
20 CAPSULE, DELAYED RELEASE ORAL EVERY OTHER DAY
Qty: 90 CAPSULE | Refills: 3 | COMMUNITY
Start: 2022-08-31

## 2022-09-22 ENCOUNTER — TELEPHONE (OUTPATIENT)
Dept: FAMILY MEDICINE CLINIC | Facility: CLINIC | Age: 87
End: 2022-09-22

## 2022-09-22 NOTE — TELEPHONE ENCOUNTER
Pt states she is having stomach pain and constipated. Has been in to see Dr Brian Robetro and still not better.  Requesting to speak to a nurse

## 2022-09-22 NOTE — TELEPHONE ENCOUNTER
Pt reports constipation issues for over a year. Having BM every other day. She states using Laxaitves works, but cause her a lot of pain for 4 days after. She took a stool softener today and had \"diarrhea like crazy. \"    Has tried Metamucil and it stopped working. Has not tried Miralax that she can recall. Pt becoming frustrated and asking what Dr. Alcides Pritchard recommends. She asks if she should get a colonoscopy.      Routed to Dr. Alcides Pritchard

## 2022-09-23 NOTE — TELEPHONE ENCOUNTER
The recurrent nature of this is certainly a little concerning  I do like Miramax in this instance - it tends to be effective but safe. Need to make sure drinking enough water to let the medication work    If the Miramax does not help after a week or two, yes, might see GI, consider c-scope.

## 2022-09-24 NOTE — TELEPHONE ENCOUNTER
Called and talked to patient gave her the instructions from Dr Kym Gay she will do this and follow up in 1 week.

## 2022-09-25 ENCOUNTER — PATIENT MESSAGE (OUTPATIENT)
Dept: FAMILY MEDICINE CLINIC | Facility: CLINIC | Age: 87
End: 2022-09-25

## 2022-10-26 ENCOUNTER — PATIENT MESSAGE (OUTPATIENT)
Dept: FAMILY MEDICINE CLINIC | Facility: CLINIC | Age: 87
End: 2022-10-26

## 2022-10-26 DIAGNOSIS — K59.09 OTHER CONSTIPATION: Primary | ICD-10-CM

## 2022-10-26 NOTE — TELEPHONE ENCOUNTER
Referral Dr. Percy Kasper M.D. Persistent constipation. Patient seen 8/31/22 by Dr. Izabella Victor M.D. Present a couple of years. Constipated at times. Taking stool softener every day. .Cramps and abdominal pain occasionally.

## 2022-10-26 NOTE — TELEPHONE ENCOUNTER
Patient's daughter [facility]. Daughter encouraged to make appt with PA so her mother can get in sooner. She voiced understanding.

## 2022-11-06 ENCOUNTER — APPOINTMENT (OUTPATIENT)
Dept: CT IMAGING | Facility: HOSPITAL | Age: 87
End: 2022-11-06
Attending: EMERGENCY MEDICINE
Payer: MEDICARE

## 2022-11-06 ENCOUNTER — APPOINTMENT (OUTPATIENT)
Dept: GENERAL RADIOLOGY | Facility: HOSPITAL | Age: 87
End: 2022-11-06
Attending: EMERGENCY MEDICINE
Payer: MEDICARE

## 2022-11-06 ENCOUNTER — HOSPITAL ENCOUNTER (INPATIENT)
Facility: HOSPITAL | Age: 87
LOS: 2 days | Discharge: HOME | End: 2022-11-08
Attending: EMERGENCY MEDICINE | Admitting: HOSPITALIST
Payer: MEDICARE

## 2022-11-06 DIAGNOSIS — G45.9 TIA (TRANSIENT ISCHEMIC ATTACK): Primary | ICD-10-CM

## 2022-11-06 LAB
ALBUMIN SERPL-MCNC: 3.7 G/DL (ref 3.4–5)
ALBUMIN/GLOB SERPL: 1 {RATIO} (ref 1–2)
ALP LIVER SERPL-CCNC: 127 U/L
ALT SERPL-CCNC: 36 U/L
ANION GAP SERPL CALC-SCNC: 6 MMOL/L (ref 0–18)
APTT PPP: 31.6 SECONDS (ref 23.3–35.6)
AST SERPL-CCNC: 31 U/L (ref 15–37)
BASOPHILS # BLD AUTO: 0.05 X10(3) UL (ref 0–0.2)
BASOPHILS NFR BLD AUTO: 0.5 %
BILIRUB SERPL-MCNC: 0.3 MG/DL (ref 0.1–2)
BUN BLD-MCNC: 21 MG/DL (ref 7–18)
CALCIUM BLD-MCNC: 9.6 MG/DL (ref 8.5–10.1)
CHLORIDE SERPL-SCNC: 108 MMOL/L (ref 98–112)
CHOLEST SERPL-MCNC: 162 MG/DL (ref ?–200)
CO2 SERPL-SCNC: 23 MMOL/L (ref 21–32)
CREAT BLD-MCNC: 1.13 MG/DL
EOSINOPHIL # BLD AUTO: 0.34 X10(3) UL (ref 0–0.7)
EOSINOPHIL NFR BLD AUTO: 3.2 %
ERYTHROCYTE [DISTWIDTH] IN BLOOD BY AUTOMATED COUNT: 14.6 %
EST. AVERAGE GLUCOSE BLD GHB EST-MCNC: 126 MG/DL (ref 68–126)
GFR SERPLBLD BASED ON 1.73 SQ M-ARVRAT: 47 ML/MIN/1.73M2 (ref 60–?)
GLOBULIN PLAS-MCNC: 3.6 G/DL (ref 2.8–4.4)
GLUCOSE BLD-MCNC: 122 MG/DL (ref 70–99)
GLUCOSE BLD-MCNC: 93 MG/DL (ref 70–99)
HBA1C MFR BLD: 6 % (ref ?–5.7)
HCT VFR BLD AUTO: 35.7 %
HDLC SERPL-MCNC: 63 MG/DL (ref 40–59)
HGB BLD-MCNC: 11.8 G/DL
IMM GRANULOCYTES # BLD AUTO: 0.02 X10(3) UL (ref 0–1)
IMM GRANULOCYTES NFR BLD: 0.2 %
INR BLD: 2.85 (ref 0.85–1.16)
LDLC SERPL CALC-MCNC: 76 MG/DL (ref ?–100)
LYMPHOCYTES # BLD AUTO: 3.45 X10(3) UL (ref 1–4)
LYMPHOCYTES NFR BLD AUTO: 32.4 %
MCH RBC QN AUTO: 30.6 PG (ref 26–34)
MCHC RBC AUTO-ENTMCNC: 33.1 G/DL (ref 31–37)
MCV RBC AUTO: 92.7 FL
MONOCYTES # BLD AUTO: 0.87 X10(3) UL (ref 0.1–1)
MONOCYTES NFR BLD AUTO: 8.2 %
NEUTROPHILS # BLD AUTO: 5.91 X10 (3) UL (ref 1.5–7.7)
NEUTROPHILS # BLD AUTO: 5.91 X10(3) UL (ref 1.5–7.7)
NEUTROPHILS NFR BLD AUTO: 55.5 %
NONHDLC SERPL-MCNC: 99 MG/DL (ref ?–130)
OSMOLALITY SERPL CALC.SUM OF ELEC: 288 MOSM/KG (ref 275–295)
PLATELET # BLD AUTO: 338 10(3)UL (ref 150–450)
POTASSIUM SERPL-SCNC: 4.3 MMOL/L (ref 3.5–5.1)
PROT SERPL-MCNC: 7.3 G/DL (ref 6.4–8.2)
PROTHROMBIN TIME: 29.5 SECONDS (ref 11.6–14.8)
RBC # BLD AUTO: 3.85 X10(6)UL
SARS-COV-2 RNA RESP QL NAA+PROBE: NOT DETECTED
SODIUM SERPL-SCNC: 137 MMOL/L (ref 136–145)
TRIGL SERPL-MCNC: 132 MG/DL (ref 30–149)
TROPONIN I HIGH SENSITIVITY: 10 NG/L
VLDLC SERPL CALC-MCNC: 20 MG/DL (ref 0–30)
WBC # BLD AUTO: 10.6 X10(3) UL (ref 4–11)

## 2022-11-06 PROCEDURE — 71045 X-RAY EXAM CHEST 1 VIEW: CPT | Performed by: EMERGENCY MEDICINE

## 2022-11-06 PROCEDURE — 74018 RADEX ABDOMEN 1 VIEW: CPT | Performed by: EMERGENCY MEDICINE

## 2022-11-06 PROCEDURE — 70450 CT HEAD/BRAIN W/O DYE: CPT | Performed by: EMERGENCY MEDICINE

## 2022-11-06 RX ORDER — DOCUSATE SODIUM 100 MG/1
100 CAPSULE, LIQUID FILLED ORAL 2 TIMES DAILY
COMMUNITY

## 2022-11-06 RX ORDER — ACETAMINOPHEN 500 MG
1000 TABLET ORAL ONCE
Status: COMPLETED | OUTPATIENT
Start: 2022-11-06 | End: 2022-11-06

## 2022-11-06 RX ORDER — ONDANSETRON 2 MG/ML
4 INJECTION INTRAMUSCULAR; INTRAVENOUS EVERY 6 HOURS PRN
Status: DISCONTINUED | OUTPATIENT
Start: 2022-11-06 | End: 2022-11-08

## 2022-11-06 RX ORDER — LISINOPRIL 20 MG/1
20 TABLET ORAL DAILY
Status: DISCONTINUED | OUTPATIENT
Start: 2022-11-06 | End: 2022-11-08

## 2022-11-06 RX ORDER — ACETAMINOPHEN 325 MG/1
650 TABLET ORAL EVERY 4 HOURS PRN
Status: DISCONTINUED | OUTPATIENT
Start: 2022-11-06 | End: 2022-11-08

## 2022-11-06 RX ORDER — METOPROLOL SUCCINATE 50 MG/1
50 TABLET, EXTENDED RELEASE ORAL DAILY
Status: DISCONTINUED | OUTPATIENT
Start: 2022-11-06 | End: 2022-11-08

## 2022-11-06 RX ORDER — ATORVASTATIN CALCIUM 80 MG/1
80 TABLET, FILM COATED ORAL NIGHTLY
Status: DISCONTINUED | OUTPATIENT
Start: 2022-11-06 | End: 2022-11-08

## 2022-11-06 RX ORDER — DOCUSATE SODIUM 100 MG/1
100 CAPSULE, LIQUID FILLED ORAL 2 TIMES DAILY
Status: DISCONTINUED | OUTPATIENT
Start: 2022-11-06 | End: 2022-11-07

## 2022-11-06 RX ORDER — DONEPEZIL HYDROCHLORIDE 10 MG/1
10 TABLET, FILM COATED ORAL NIGHTLY
Status: DISCONTINUED | OUTPATIENT
Start: 2022-11-06 | End: 2022-11-08

## 2022-11-06 RX ORDER — ASPIRIN 325 MG
325 TABLET ORAL DAILY
Status: DISCONTINUED | OUTPATIENT
Start: 2022-11-06 | End: 2022-11-08

## 2022-11-06 RX ORDER — ACETAMINOPHEN 650 MG/1
650 SUPPOSITORY RECTAL EVERY 4 HOURS PRN
Status: DISCONTINUED | OUTPATIENT
Start: 2022-11-06 | End: 2022-11-08

## 2022-11-06 RX ORDER — LABETALOL HYDROCHLORIDE 5 MG/ML
10 INJECTION, SOLUTION INTRAVENOUS EVERY 10 MIN PRN
Status: DISCONTINUED | OUTPATIENT
Start: 2022-11-06 | End: 2022-11-08

## 2022-11-06 RX ORDER — PRAVASTATIN SODIUM 10 MG
10 TABLET ORAL NIGHTLY
Status: DISCONTINUED | OUTPATIENT
Start: 2022-11-06 | End: 2022-11-06

## 2022-11-06 RX ORDER — ASPIRIN 300 MG/1
300 SUPPOSITORY RECTAL DAILY
Status: DISCONTINUED | OUTPATIENT
Start: 2022-11-06 | End: 2022-11-08

## 2022-11-06 RX ORDER — HYDRALAZINE HYDROCHLORIDE 20 MG/ML
10 INJECTION INTRAMUSCULAR; INTRAVENOUS EVERY 2 HOUR PRN
Status: DISCONTINUED | OUTPATIENT
Start: 2022-11-06 | End: 2022-11-08

## 2022-11-06 RX ORDER — SODIUM CHLORIDE 9 MG/ML
INJECTION, SOLUTION INTRAVENOUS CONTINUOUS
Status: DISCONTINUED | OUTPATIENT
Start: 2022-11-06 | End: 2022-11-08

## 2022-11-06 RX ORDER — METOCLOPRAMIDE HYDROCHLORIDE 5 MG/ML
5 INJECTION INTRAMUSCULAR; INTRAVENOUS EVERY 8 HOURS PRN
Status: DISCONTINUED | OUTPATIENT
Start: 2022-11-06 | End: 2022-11-08

## 2022-11-06 NOTE — ED INITIAL ASSESSMENT (HPI)
Pt states that 30 minutes PTA she developed difficulty talking and dizziness. + blood thinners. Pt appears anxious. Pt also c/o of constipation and has been taking colace.

## 2022-11-07 ENCOUNTER — APPOINTMENT (OUTPATIENT)
Dept: MRI IMAGING | Facility: HOSPITAL | Age: 87
End: 2022-11-07
Attending: Other
Payer: MEDICARE

## 2022-11-07 ENCOUNTER — NURSE ONLY (OUTPATIENT)
Dept: ELECTROPHYSIOLOGY | Facility: HOSPITAL | Age: 87
End: 2022-11-07
Attending: Other
Payer: MEDICARE

## 2022-11-07 ENCOUNTER — TELEPHONE (OUTPATIENT)
Dept: NEUROLOGY | Facility: CLINIC | Age: 87
End: 2022-11-07

## 2022-11-07 ENCOUNTER — APPOINTMENT (OUTPATIENT)
Dept: CV DIAGNOSTICS | Facility: HOSPITAL | Age: 87
End: 2022-11-07
Attending: Other
Payer: MEDICARE

## 2022-11-07 LAB
ATRIAL RATE: 66 BPM
GLUCOSE BLD-MCNC: 135 MG/DL (ref 70–99)
GLUCOSE BLD-MCNC: 81 MG/DL (ref 70–99)
GLUCOSE BLD-MCNC: 93 MG/DL (ref 70–99)
GLUCOSE BLD-MCNC: 96 MG/DL (ref 70–99)
INR BLD: 2.79 (ref 0.85–1.16)
P AXIS: 42 DEGREES
P-R INTERVAL: 156 MS
PROTHROMBIN TIME: 29.1 SECONDS (ref 11.6–14.8)
Q-T INTERVAL: 388 MS
QRS DURATION: 68 MS
QTC CALCULATION (BEZET): 406 MS
R AXIS: 17 DEGREES
T AXIS: 48 DEGREES
VENTRICULAR RATE: 66 BPM

## 2022-11-07 PROCEDURE — 99233 SBSQ HOSP IP/OBS HIGH 50: CPT | Performed by: HOSPITALIST

## 2022-11-07 PROCEDURE — 95819 EEG AWAKE AND ASLEEP: CPT | Performed by: OTHER

## 2022-11-07 PROCEDURE — 93306 TTE W/DOPPLER COMPLETE: CPT | Performed by: OTHER

## 2022-11-07 PROCEDURE — 70547 MR ANGIOGRAPHY NECK W/O DYE: CPT | Performed by: OTHER

## 2022-11-07 PROCEDURE — 70551 MRI BRAIN STEM W/O DYE: CPT | Performed by: OTHER

## 2022-11-07 PROCEDURE — 70544 MR ANGIOGRAPHY HEAD W/O DYE: CPT | Performed by: OTHER

## 2022-11-07 PROCEDURE — 99223 1ST HOSP IP/OBS HIGH 75: CPT | Performed by: OTHER

## 2022-11-07 RX ORDER — POLYETHYLENE GLYCOL 3350 17 G/17G
17 POWDER, FOR SOLUTION ORAL DAILY
Status: DISCONTINUED | OUTPATIENT
Start: 2022-11-07 | End: 2022-11-08

## 2022-11-07 RX ORDER — POLYETHYLENE GLYCOL 3350 17 G/17G
17 POWDER, FOR SOLUTION ORAL DAILY PRN
Status: DISCONTINUED | OUTPATIENT
Start: 2022-11-07 | End: 2022-11-07

## 2022-11-07 RX ORDER — SODIUM PHOSPHATE, DIBASIC AND SODIUM PHOSPHATE, MONOBASIC 7; 19 G/133ML; G/133ML
1 ENEMA RECTAL ONCE AS NEEDED
Status: DISCONTINUED | OUTPATIENT
Start: 2022-11-07 | End: 2022-11-08

## 2022-11-07 RX ORDER — WARFARIN SODIUM 1 MG/1
3 TABLET ORAL
Status: COMPLETED | OUTPATIENT
Start: 2022-11-07 | End: 2022-11-07

## 2022-11-07 RX ORDER — BISACODYL 10 MG
10 SUPPOSITORY, RECTAL RECTAL
Status: DISCONTINUED | OUTPATIENT
Start: 2022-11-07 | End: 2022-11-08

## 2022-11-07 RX ORDER — DOCUSATE SODIUM 100 MG/1
100 CAPSULE, LIQUID FILLED ORAL 2 TIMES DAILY
Status: DISCONTINUED | OUTPATIENT
Start: 2022-11-07 | End: 2022-11-08

## 2022-11-07 NOTE — TELEPHONE ENCOUNTER
TIA CLINIC SCREENING    1. Date of ED visit/TIA diagnosis:  11/6/22   Time of discharge from ED:  n/a    2. Is patient currently admitted? Yes   If YES - TIA Clinic Appointment not required.

## 2022-11-07 NOTE — ED QUICK NOTES
Orders for admission, patient is aware of plan and ready to go upstairs. Any questions, please call ED RN Harpreet Loera at extension [phone]. Patient Covid vaccination status: Fully vaccinated     COVID Test Ordered in ED: Rapid SARS-CoV-2 by PCR    COVID Suspicion at Admission: No risk with negative rapid    Running Infusions:  None    Mental Status/LOC at time of transport: alert and oriented x4. Symptoms resolved at this time.      Other pertinent information:   CIWA score: N/A   NIH score:  0

## 2022-11-07 NOTE — SLP NOTE
Order received for bedside swallow evaluation however, patient currently out of the room for MRI. Will re-attempt as pt appropriate and available.

## 2022-11-07 NOTE — PLAN OF CARE
Problem: NEUROLOGICAL - ADULT  Goal: Achieves stable or improved neurological status  Description: INTERVENTIONS  - Assess for and report changes in neurological status  - Initiate measures to prevent increased intracranial pressure  - Maintain blood pressure and fluid volume within ordered parameters to optimize cerebral perfusion and minimize risk of hemorrhage  - Monitor temperature, glucose, and sodium.  Initiate appropriate interventions as ordered  - Anticipate blood pressure changes with medications and accommodate patient stability  11/7/2022 1204 by Diana Cordon RN  Outcome: Progressing  11/7/2022 1147 by Diana Cordon RN  Outcome: Progressing

## 2022-11-07 NOTE — PLAN OF CARE
Assumed care at 2100. Neuro checks completed q 2 hours; no residual deficits. VSS. Aox4. NSR. RA. Denies pain. Up standby. I&O WDL. Passed bedside swallow; tolerating general diet.    IVF infusing 0.9 @ 75 ml/hr  Plan for MRI today

## 2022-11-08 VITALS
DIASTOLIC BLOOD PRESSURE: 83 MMHG | WEIGHT: 130 LBS | BODY MASS INDEX: 23.33 KG/M2 | RESPIRATION RATE: 18 BRPM | HEIGHT: 62.6 IN | TEMPERATURE: 98 F | OXYGEN SATURATION: 100 % | HEART RATE: 55 BPM | SYSTOLIC BLOOD PRESSURE: 137 MMHG

## 2022-11-08 LAB
GLUCOSE BLD-MCNC: 87 MG/DL (ref 70–99)
GLUCOSE BLD-MCNC: 87 MG/DL (ref 70–99)
INR BLD: 2.21 (ref 0.85–1.16)
PROTHROMBIN TIME: 24.3 SECONDS (ref 11.6–14.8)

## 2022-11-08 PROCEDURE — 99233 SBSQ HOSP IP/OBS HIGH 50: CPT | Performed by: OTHER

## 2022-11-08 PROCEDURE — 99239 HOSP IP/OBS DSCHRG MGMT >30: CPT | Performed by: HOSPITALIST

## 2022-11-08 RX ORDER — ASPIRIN 325 MG
325 TABLET ORAL DAILY
Qty: 90 TABLET | Refills: 0 | Status: SHIPPED | OUTPATIENT
Start: 2022-11-09

## 2022-11-08 NOTE — PLAN OF CARE
Assumed care at [address].    A&Ox4, RA, NSR on tele  Q4 Neuro, no new deficits   No complaints of pain   Call light within reach    Patient updated on plan of care

## 2022-11-08 NOTE — PLAN OF CARE
Assumed care at 0700  Pt EmilioOx4, SR on tele, RA  Neuro assessment unchanged  No c/o pain  Pt states she feels back to baseline  Plan to dc today.  Pt and family updated on POC  Pt handed off to Marely

## 2022-11-08 NOTE — PLAN OF CARE
NURSING DISCHARGE NOTE    Discharged Home via Wheelchair. Accompanied by Support staff  Belongings Taken by patient/family. Discharge papers given and explained to patient and family. Patient took all belongings.

## 2022-11-08 NOTE — PROCEDURES
ELECTROENCEPHALOGRAM REPORT      Patient Name: Ama Lo   : 3/23/1935   Date of Test: 2022   History: 80year old female with episode of inability to speak    TECHNICAL ASPECTS OF EEG RECORDING:  This is a scalp EEG monitoring study. Scalp electrodes were positioned according to the 10-20 International system of electrode placement. EEG data were recorded continuously and digitally stored, and this is a routine study. No sedation was given. EEG data were reviewed using bipolar and referential montages. DESCRIPTION OF EEG FINDINGS:  BACKGROUND ACTIVITY: The background rhythm consists of well organized, medium amplitude 8 Hertz waves, which are symmetrical. They do attenuate with eye opening. INTERICTAL EPILEPTIFORM ACTIVITY: None  ICTAL ACTIVITY: None  ACTIVATION PROCEDURES: Not performed  SLEEP: Sleep demonstrated decreased amplitude with diffuse slowing and no epileptiform activity. IMPRESSION:  This is a normal, awake and asleep electroencephalogram.  There is no focal abnormality or epileptiform activity seen in this record.       Amaya Atkins DO  Neuromuscular and General Neurology  [facility]

## 2022-11-09 ENCOUNTER — TELEPHONE (OUTPATIENT)
Dept: NEUROLOGY | Facility: CLINIC | Age: 87
End: 2022-11-09

## 2022-11-09 ENCOUNTER — PATIENT OUTREACH (OUTPATIENT)
Dept: CASE MANAGEMENT | Age: 87
End: 2022-11-09

## 2022-11-09 DIAGNOSIS — G45.9 TIA (TRANSIENT ISCHEMIC ATTACK): ICD-10-CM

## 2022-11-09 DIAGNOSIS — Z02.9 ENCOUNTERS FOR UNSPECIFIED ADMINISTRATIVE PURPOSE: ICD-10-CM

## 2022-11-09 PROCEDURE — 1111F DSCHRG MED/CURRENT MED MERGE: CPT

## 2022-11-09 NOTE — TELEPHONE ENCOUNTER
Pt's daughter is calling on behalf of the pt to possibly try to get the pt squeezed in for a f/u visit. Becki Peabody states pt's  also has an appt on 12/8/22 and would like if pt can be seen on the same day? Provider has no availability on 12/8/22.  Pls advise

## 2022-11-11 NOTE — TELEPHONE ENCOUNTER
Pts daughter calling 2nd time to ask if both her Mom and step father can have appts on the same day 12/8/22

## 2022-11-22 ENCOUNTER — OFFICE VISIT (OUTPATIENT)
Dept: FAMILY MEDICINE CLINIC | Facility: CLINIC | Age: 87
End: 2022-11-22
Payer: MEDICARE

## 2022-11-22 VITALS
TEMPERATURE: 99 F | DIASTOLIC BLOOD PRESSURE: 70 MMHG | WEIGHT: 124 LBS | BODY MASS INDEX: 24.35 KG/M2 | HEART RATE: 60 BPM | HEIGHT: 60 IN | SYSTOLIC BLOOD PRESSURE: 120 MMHG

## 2022-11-22 DIAGNOSIS — K59.00 CONSTIPATION, UNSPECIFIED CONSTIPATION TYPE: ICD-10-CM

## 2022-11-22 DIAGNOSIS — F41.9 ANXIETY: ICD-10-CM

## 2022-11-22 DIAGNOSIS — I10 PRIMARY HYPERTENSION: ICD-10-CM

## 2022-11-22 DIAGNOSIS — I27.20 PULMONARY HYPERTENSION (HCC): ICD-10-CM

## 2022-11-22 DIAGNOSIS — G45.9 TIA (TRANSIENT ISCHEMIC ATTACK): Primary | ICD-10-CM

## 2022-11-22 DIAGNOSIS — E78.00 PURE HYPERCHOLESTEROLEMIA: ICD-10-CM

## 2022-11-22 PROCEDURE — 99495 TRANSJ CARE MGMT MOD F2F 14D: CPT | Performed by: FAMILY MEDICINE

## 2022-11-22 PROCEDURE — 1111F DSCHRG MED/CURRENT MED MERGE: CPT | Performed by: FAMILY MEDICINE

## 2022-11-22 RX ORDER — ESCITALOPRAM OXALATE 10 MG/1
15 TABLET ORAL DAILY
Qty: 135 TABLET | Refills: 0 | COMMUNITY
Start: 2022-11-22

## 2022-12-08 ENCOUNTER — OFFICE VISIT (OUTPATIENT)
Dept: NEUROLOGY | Facility: CLINIC | Age: 87
End: 2022-12-08
Payer: MEDICARE

## 2022-12-08 VITALS
WEIGHT: 125 LBS | DIASTOLIC BLOOD PRESSURE: 68 MMHG | RESPIRATION RATE: 16 BRPM | HEART RATE: 70 BPM | SYSTOLIC BLOOD PRESSURE: 116 MMHG | BODY MASS INDEX: 24 KG/M2

## 2022-12-08 DIAGNOSIS — R41.3 SHORT-TERM MEMORY LOSS: ICD-10-CM

## 2022-12-08 DIAGNOSIS — R47.89 EPISODE OF CHANGE IN SPEECH: Primary | ICD-10-CM

## 2022-12-08 DIAGNOSIS — F41.9 ANXIETY: ICD-10-CM

## 2022-12-08 DIAGNOSIS — Z79.01 ANTICOAGULATED: ICD-10-CM

## 2022-12-08 DIAGNOSIS — R10.10 PAIN OF UPPER ABDOMEN: ICD-10-CM

## 2022-12-08 PROCEDURE — 99215 OFFICE O/P EST HI 40 MIN: CPT | Performed by: OTHER

## 2022-12-08 RX ORDER — DOCUSATE SODIUM 100 MG/1
100 CAPSULE, LIQUID FILLED ORAL 3 TIMES DAILY PRN
COMMUNITY

## 2022-12-09 RX ORDER — ESCITALOPRAM OXALATE 10 MG/1
TABLET ORAL
Qty: 135 TABLET | Refills: 0 | OUTPATIENT
Start: 2022-12-09

## 2022-12-09 NOTE — TELEPHONE ENCOUNTER
Request for Escitalopram Oxalate 10 Mg denied. Pt was prescribed this medication on 11/22/22 for 135 tabs 1.5 tabs daily- pt should have enough until feb 2023. If there is any questions or issues, please call office.

## 2023-01-17 ENCOUNTER — LAB ENCOUNTER (OUTPATIENT)
Dept: LAB | Age: 88
End: 2023-01-17
Attending: FAMILY MEDICINE
Payer: MEDICARE

## 2023-01-17 DIAGNOSIS — R73.09 ELEVATED GLUCOSE: ICD-10-CM

## 2023-01-17 DIAGNOSIS — E78.00 PURE HYPERCHOLESTEROLEMIA: ICD-10-CM

## 2023-01-17 LAB
ALBUMIN SERPL-MCNC: 3.9 G/DL (ref 3.4–5)
ALBUMIN/GLOB SERPL: 1.3 {RATIO} (ref 1–2)
ALP LIVER SERPL-CCNC: 108 U/L
ALT SERPL-CCNC: 19 U/L
ANION GAP SERPL CALC-SCNC: 5 MMOL/L (ref 0–18)
AST SERPL-CCNC: 20 U/L (ref 15–37)
BILIRUB SERPL-MCNC: 0.4 MG/DL (ref 0.1–2)
BUN BLD-MCNC: 19 MG/DL (ref 7–18)
BUN/CREAT SERPL: 17.1 (ref 10–20)
CALCIUM BLD-MCNC: 9.9 MG/DL (ref 8.5–10.1)
CHLORIDE SERPL-SCNC: 108 MMOL/L (ref 98–112)
CHOLEST SERPL-MCNC: 170 MG/DL (ref ?–200)
CO2 SERPL-SCNC: 28 MMOL/L (ref 21–32)
CREAT BLD-MCNC: 1.11 MG/DL
EST. AVERAGE GLUCOSE BLD GHB EST-MCNC: 120 MG/DL (ref 68–126)
FASTING PATIENT LIPID ANSWER: YES
FASTING STATUS PATIENT QL REPORTED: YES
GFR SERPLBLD BASED ON 1.73 SQ M-ARVRAT: 48 ML/MIN/1.73M2 (ref 60–?)
GLOBULIN PLAS-MCNC: 3 G/DL (ref 2.8–4.4)
GLUCOSE BLD-MCNC: 99 MG/DL (ref 70–99)
HBA1C MFR BLD: 5.8 % (ref ?–5.7)
HDLC SERPL-MCNC: 67 MG/DL (ref 40–59)
LDLC SERPL CALC-MCNC: 86 MG/DL (ref ?–100)
NONHDLC SERPL-MCNC: 103 MG/DL (ref ?–130)
OSMOLALITY SERPL CALC.SUM OF ELEC: 294 MOSM/KG (ref 275–295)
POTASSIUM SERPL-SCNC: 5 MMOL/L (ref 3.5–5.1)
PROT SERPL-MCNC: 6.9 G/DL (ref 6.4–8.2)
SODIUM SERPL-SCNC: 141 MMOL/L (ref 136–145)
TRIGL SERPL-MCNC: 93 MG/DL (ref 30–149)
VLDLC SERPL CALC-MCNC: 15 MG/DL (ref 0–30)

## 2023-01-17 PROCEDURE — 80061 LIPID PANEL: CPT

## 2023-01-17 PROCEDURE — 83036 HEMOGLOBIN GLYCOSYLATED A1C: CPT

## 2023-01-17 PROCEDURE — 80053 COMPREHEN METABOLIC PANEL: CPT

## 2023-01-18 ENCOUNTER — PATIENT MESSAGE (OUTPATIENT)
Dept: FAMILY MEDICINE CLINIC | Facility: CLINIC | Age: 88
End: 2023-01-18

## 2023-01-18 DIAGNOSIS — Z11.1 SCREENING-PULMONARY TB: Primary | ICD-10-CM

## 2023-01-18 NOTE — TELEPHONE ENCOUNTER
From: Jimenez Monk  To: Bill Maxwell MD  Sent: 1/18/2023 10:46 AM CST  Subject: TB blood test    Good morning Doctor, As I wrote in dad's chart they have decided assisted living is a good thing. We found a place and it requires a TB test. I'm hoping you can order a blood test just to make it easy and keep the running around to a minimum. I'll be taking them for coumadin on Friday and was hoping to have it done then. If you could order for both that would be great. Glad their other blood work looks good.  Thanks again and we'll see you next Tuesday  Leeanna Cowden

## 2023-01-18 NOTE — TELEPHONE ENCOUNTER
Planning on entering assisted living.  Needs TB test. Daughter asking for TB blood test. Has Medicare appointment 1/24/2023 with     Pended test Routed to [address]

## 2023-01-20 ENCOUNTER — LAB ENCOUNTER (OUTPATIENT)
Dept: LAB | Age: 88
End: 2023-01-20
Attending: FAMILY MEDICINE
Payer: MEDICARE

## 2023-01-20 DIAGNOSIS — Z11.1 SCREENING-PULMONARY TB: ICD-10-CM

## 2023-01-20 PROCEDURE — 86480 TB TEST CELL IMMUN MEASURE: CPT

## 2023-01-20 PROCEDURE — 36415 COLL VENOUS BLD VENIPUNCTURE: CPT

## 2023-01-23 LAB
M TB IFN-G CD4+ T-CELLS BLD-ACNC: 0.05 IU/ML
M TB TUBERC IFN-G BLD QL: NEGATIVE
M TB TUBERC IGNF/MITOGEN IGNF CONTROL: >10 IU/ML
QFT TB1 AG MINUS NIL: 0.08 IU/ML
QFT TB2 AG MINUS NIL: 0.05 IU/ML

## 2023-01-24 ENCOUNTER — OFFICE VISIT (OUTPATIENT)
Dept: FAMILY MEDICINE CLINIC | Facility: CLINIC | Age: 88
End: 2023-01-24
Payer: MEDICARE

## 2023-01-24 VITALS
HEART RATE: 54 BPM | RESPIRATION RATE: 16 BRPM | TEMPERATURE: 98 F | WEIGHT: 124 LBS | HEIGHT: 59 IN | BODY MASS INDEX: 25 KG/M2 | SYSTOLIC BLOOD PRESSURE: 110 MMHG | OXYGEN SATURATION: 100 % | DIASTOLIC BLOOD PRESSURE: 50 MMHG

## 2023-01-24 DIAGNOSIS — Z86.73 HISTORY OF ISCHEMIC STROKE: ICD-10-CM

## 2023-01-24 DIAGNOSIS — F32.5 MAJOR DEPRESSION IN REMISSION (HCC): ICD-10-CM

## 2023-01-24 DIAGNOSIS — K21.9 GASTROESOPHAGEAL REFLUX DISEASE, UNSPECIFIED WHETHER ESOPHAGITIS PRESENT: Chronic | ICD-10-CM

## 2023-01-24 DIAGNOSIS — F02.80 ALZHEIMER'S DEMENTIA WITHOUT BEHAVIORAL DISTURBANCE (HCC): ICD-10-CM

## 2023-01-24 DIAGNOSIS — H90.3 SENSORINEURAL HEARING LOSS (SNHL) OF BOTH EARS: ICD-10-CM

## 2023-01-24 DIAGNOSIS — I10 PRIMARY HYPERTENSION: ICD-10-CM

## 2023-01-24 DIAGNOSIS — Z00.00 ENCOUNTER FOR ANNUAL HEALTH EXAMINATION: Primary | ICD-10-CM

## 2023-01-24 DIAGNOSIS — I27.20 PULMONARY HYPERTENSION (HCC): ICD-10-CM

## 2023-01-24 DIAGNOSIS — E78.00 PURE HYPERCHOLESTEROLEMIA: ICD-10-CM

## 2023-01-24 DIAGNOSIS — K59.00 CONSTIPATION, UNSPECIFIED CONSTIPATION TYPE: ICD-10-CM

## 2023-01-24 DIAGNOSIS — N18.31 STAGE 3A CHRONIC KIDNEY DISEASE (HCC): ICD-10-CM

## 2023-01-24 DIAGNOSIS — G30.9 ALZHEIMER'S DEMENTIA WITHOUT BEHAVIORAL DISTURBANCE (HCC): ICD-10-CM

## 2023-01-24 DIAGNOSIS — Z87.11 HISTORY OF GASTRIC ULCER: ICD-10-CM

## 2023-01-24 DIAGNOSIS — F41.9 ANXIETY: ICD-10-CM

## 2023-01-24 PROCEDURE — 1125F AMNT PAIN NOTED PAIN PRSNT: CPT | Performed by: FAMILY MEDICINE

## 2023-01-24 PROCEDURE — G0439 PPPS, SUBSEQ VISIT: HCPCS | Performed by: FAMILY MEDICINE

## 2023-01-25 ENCOUNTER — TELEPHONE (OUTPATIENT)
Dept: FAMILY MEDICINE CLINIC | Facility: CLINIC | Age: 88
End: 2023-01-25

## 2023-01-25 NOTE — TELEPHONE ENCOUNTER
Forms were faxed to Jd Puente @[facility] assisted living and Memory care copies made and send to scan

## 2023-01-30 DIAGNOSIS — F41.9 ANXIETY: ICD-10-CM

## 2023-01-30 DIAGNOSIS — R41.3 SHORT-TERM MEMORY LOSS: ICD-10-CM

## 2023-01-31 RX ORDER — ESCITALOPRAM OXALATE 10 MG/1
15 TABLET ORAL DAILY
Qty: 135 TABLET | Refills: 0 | OUTPATIENT
Start: 2023-01-31

## 2023-02-01 RX ORDER — DONEPEZIL HYDROCHLORIDE 10 MG/1
10 TABLET, FILM COATED ORAL NIGHTLY
Qty: 90 TABLET | Refills: 1 | Status: SHIPPED | OUTPATIENT
Start: 2023-02-01

## 2024-01-27 ENCOUNTER — TELEPHONE (OUTPATIENT)
Dept: FAMILY MEDICINE CLINIC | Facility: CLINIC | Age: 89
End: 2024-01-27

## 2024-01-30 NOTE — TELEPHONE ENCOUNTER
Requested Prescriptions     Pending Prescriptions Disp Refills    ACETAMINOPHEN EXTRA STRENGTH 500 MG Oral Tab [Pharmacy Med Name: Acetaminophen Extra Strength 500 MG Tablet] 2 tablet PRN     Sig: TAKE 2 TABLETS (1000MG) BY MOUTH ONCE DAILY  (MAX 3GMS/APAP PER 24HRS)    ASPIRIN LOW DOSE 81 MG Oral Tab EC [Pharmacy Med Name: Aspirin Low Dose 81 MG Tablet delayed release] 1 tablet PRN     Sig: TAKE 1 TABLET BY MOUTH ONCE DAILY  .    PRAVASTATIN 40 MG Oral Tab [Pharmacy Med Name: Pravastatin Sodium 40 MG Tablet] 1 tablet PRN     Sig: TAKE 1 TABLET BY MOUTH AT BEDTIME  .     LOV Visit date not found     Patient was asked to follow-up in: 1 year    Appointment scheduled: Visit date not found     Medication failed protocol.    Routed to  to schedule MAW.

## 2024-03-05 RX ORDER — ASPIRIN 81 MG/1
81 TABLET, COATED ORAL DAILY
Qty: 30 TABLET | Refills: 0 | OUTPATIENT
Start: 2024-03-05

## 2024-03-05 RX ORDER — PRAVASTATIN SODIUM 40 MG
40 TABLET ORAL NIGHTLY
Qty: 30 TABLET | Refills: 0 | OUTPATIENT
Start: 2024-03-05

## 2024-03-05 RX ORDER — PSEUDOEPHED/ACETAMINOPH/DIPHEN 30MG-500MG
TABLET ORAL
Qty: 30 TABLET | Refills: 0 | OUTPATIENT
Start: 2024-03-05

## 2024-05-08 ENCOUNTER — TELEPHONE (OUTPATIENT)
Dept: FAMILY MEDICINE CLINIC | Facility: CLINIC | Age: 89
End: 2024-05-08

## (undated) DIAGNOSIS — R63.30 FEEDING DIFFICULTIES, UNSPECIFIED: ICD-10-CM

## (undated) DIAGNOSIS — R41.3 SHORT-TERM MEMORY LOSS: Primary | ICD-10-CM

## (undated) DIAGNOSIS — K21.9 GASTROESOPHAGEAL REFLUX DISEASE, UNSPECIFIED WHETHER ESOPHAGITIS PRESENT: Chronic | ICD-10-CM

## (undated) DEVICE — PILLOW ABDUCTION HIP SM

## (undated) DEVICE — SHEET,DRAPE,70X100,STERILE: Brand: MEDLINE

## (undated) DEVICE — SPECIMEN CONTAINER,POSITIVE SEAL INDICATOR, OR PACKAGED: Brand: PRECISION

## (undated) DEVICE — KENDALL SCD EXPRESS SLEEVES, KNEE LENGTH, MEDIUM: Brand: KENDALL SCD

## (undated) DEVICE — Device

## (undated) DEVICE — SUTURE ETHIBOND 5 CCS

## (undated) DEVICE — SUTURE VICRYL 1 CPX

## (undated) DEVICE — HOOD, PEEL-AWAY: Brand: FLYTE

## (undated) DEVICE — ANTERIOR HIP: Brand: MEDLINE INDUSTRIES, INC.

## (undated) DEVICE — CODMAN® INTEGRATED BIPOLAR CORD AND TUBING SET FLYING LEADS, ROTARY PUMP: Brand: CODMAN®

## (undated) DEVICE — 5.5MM ROUND FAST CUTTING BUR

## (undated) DEVICE — BLADE ELECTRODE: Brand: EDGE

## (undated) DEVICE — PADDING CAST COTTON  4

## (undated) DEVICE — Device: Brand: STABLECUT®

## (undated) DEVICE — SUTURE VICRYL 2-0 CP-1

## (undated) DEVICE — DECANTER BAG 9": Brand: MEDLINE INDUSTRIES, INC.

## (undated) DEVICE — DRAPE C-ARM UNIVERSAL

## (undated) DEVICE — Device: Brand: PLUMEPEN

## (undated) DEVICE — DRESSING AQUACEL AG 3.5 X 10

## (undated) DEVICE — WRAP COOLING HIP W/ICE PILLOW

## (undated) DEVICE — STERILE POLYISOPRENE POWDER-FREE SURGICAL GLOVES: Brand: PROTEXIS

## (undated) DEVICE — GOWN SURG AERO CHROME XXL

## (undated) NOTE — LETTER
[facility] Testing Department  Phone: [phone]  OUTSIDE TESTING RESULT REQUEST      TO:   Dr Springer Cornea Date: 9/25/19    FAX #: [phone]     IMPORTANT: FOR YOUR IMMEDIATE ATTENTION  Please FAX all test results listed below to: [phone]